# Patient Record
Sex: FEMALE | Race: WHITE | NOT HISPANIC OR LATINO | URBAN - METROPOLITAN AREA
[De-identification: names, ages, dates, MRNs, and addresses within clinical notes are randomized per-mention and may not be internally consistent; named-entity substitution may affect disease eponyms.]

---

## 2017-07-20 ENCOUNTER — OFFICE (OUTPATIENT)
Dept: URBAN - METROPOLITAN AREA OTHER 6 | Facility: OTHER | Age: 62
End: 2017-07-20

## 2017-07-20 VITALS
WEIGHT: 124 LBS | DIASTOLIC BLOOD PRESSURE: 68 MMHG | HEIGHT: 64 IN | SYSTOLIC BLOOD PRESSURE: 120 MMHG | HEART RATE: 74 BPM

## 2017-07-20 DIAGNOSIS — Z86.010 PERSONAL HISTORY OF COLONIC POLYPS: ICD-10-CM

## 2017-07-20 DIAGNOSIS — K59.00 CONSTIPATION, UNSPECIFIED: ICD-10-CM

## 2017-07-20 PROCEDURE — 99213 OFFICE O/P EST LOW 20 MIN: CPT

## 2017-07-20 RX ORDER — SODIUM PHOSPHATE, MONOBASIC, MONOHYDRATE, SODIUM PHOSPHATE, DIBASIC ANHYDROUS 1.102; .398 G/1; G/1
TABLET ORAL
Qty: 28 | Refills: 0 | Status: ACTIVE
Start: 2017-07-20

## 2017-07-20 RX ORDER — POLYETHYLENE GLYCOL 3350 17 G/17G
17 POWDER, FOR SOLUTION ORAL
Qty: 1 | Refills: 11 | Status: ACTIVE
Start: 2017-07-20

## 2017-07-31 ENCOUNTER — OFFICE VISIT (OUTPATIENT)
Dept: ORTHOPEDIC SURGERY | Facility: CLINIC | Age: 62
End: 2017-07-31

## 2017-07-31 VITALS
DIASTOLIC BLOOD PRESSURE: 80 MMHG | HEART RATE: 74 BPM | BODY MASS INDEX: 20.83 KG/M2 | SYSTOLIC BLOOD PRESSURE: 115 MMHG | HEIGHT: 65 IN | WEIGHT: 125 LBS

## 2017-07-31 DIAGNOSIS — M17.12 PATELLOFEMORAL ARTHRITIS OF LEFT KNEE: ICD-10-CM

## 2017-07-31 DIAGNOSIS — S80.01XA CONTUSION OF RIGHT KNEE, INITIAL ENCOUNTER: ICD-10-CM

## 2017-07-31 DIAGNOSIS — S80.02XA CONTUSION OF LEFT KNEE, INITIAL ENCOUNTER: ICD-10-CM

## 2017-07-31 DIAGNOSIS — R52 PAIN: Primary | ICD-10-CM

## 2017-07-31 DIAGNOSIS — M17.11 PATELLOFEMORAL ARTHRITIS OF RIGHT KNEE: ICD-10-CM

## 2017-07-31 PROCEDURE — 73562 X-RAY EXAM OF KNEE 3: CPT | Performed by: ORTHOPAEDIC SURGERY

## 2017-07-31 PROCEDURE — 99214 OFFICE O/P EST MOD 30 MIN: CPT | Performed by: ORTHOPAEDIC SURGERY

## 2017-07-31 RX ORDER — ALENDRONATE SODIUM 70 MG/1
TABLET ORAL
Refills: 11 | COMMUNITY
Start: 2017-05-18 | End: 2020-05-28

## 2017-07-31 RX ORDER — MELOXICAM 7.5 MG/1
7.5 TABLET ORAL DAILY
Qty: 30 TABLET | Refills: 5 | Status: SHIPPED | OUTPATIENT
Start: 2017-07-31 | End: 2018-11-28

## 2017-07-31 NOTE — PROGRESS NOTES
Subjective: Bilateral knee pain right worse than left     Patient ID: Ani Chandler is a 61 y.o. female.    Chief Complaint:    History of Present Illness 61-year-old female presents evaluation of both knees which he injured at work on June 20 when she fell.  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  She had arthroscopic surgery in the right knee by me in October 2015 a states is doing well to this episode.  No other associated injuries with this incident on June 20.  States she developed ecchymosis to both knees and had moderate pain in that knee since that time.  Did not seek attention until today.  Although somewhat better still having pain particularly in the right knee posteriorly.  She is not taking any anti-inflammatory medication.       Social History     Occupational History   • Not on file.     Social History Main Topics   • Smoking status: Never Smoker   • Smokeless tobacco: Not on file   • Alcohol use No   • Drug use: No   • Sexual activity: Not on file      Review of Systems   Constitutional: Negative for chills, diaphoresis, fever and unexpected weight change.   HENT: Positive for hearing loss and tinnitus. Negative for nosebleeds and sore throat.    Eyes: Negative for pain and visual disturbance.   Respiratory: Negative for cough, shortness of breath and wheezing.    Cardiovascular: Negative for chest pain and palpitations.   Gastrointestinal: Negative for abdominal pain, diarrhea, nausea and vomiting.   Endocrine: Positive for cold intolerance. Negative for heat intolerance and polydipsia.   Genitourinary: Negative for difficulty urinating, dysuria and hematuria.   Musculoskeletal: Negative for arthralgias, joint swelling and myalgias.   Skin: Negative for rash and wound.   Allergic/Immunologic: Positive for environmental allergies.   Neurological: Negative for dizziness, syncope and numbness.   Hematological: Does not bruise/bleed easily.    Psychiatric/Behavioral: Negative for dysphoric mood and sleep disturbance. The patient is nervous/anxious.    All other systems reviewed and are negative.        Past Medical History:   Diagnosis Date   • Cancer     Breast   • Depression     with anxiety   • GERD (gastroesophageal reflux disease)    • Hypothyroidism    • Menopausal symptoms    • Osteopenia    • Pancreatitis     2002   • Polyp of sigmoid colon    • Thyroid disease      Past Surgical History:   Procedure Laterality Date   • BREAST SURGERY     • CATARACT EXTRACTION     • HYSTERECTOMY      In 1997 for a cyst     Family History   Problem Relation Age of Onset   • Hypertension Mother    • Diabetes Father    • Hypertension Father    • Heart disease Other    • Stroke Other          Objective:  Vitals:    07/31/17 0920   BP: 115/80   Pulse: 74     Last 3 weights    07/31/17 0920   Weight: 125 lb (56.7 kg)     Body mass index is 20.8 kg/(m^2).       Ortho Exam  AP lateral sunrise view of both knees done to evaluate her chief complain shows patellofemoral arthritis but no other acute or chronic changes noted.  She is alert and oriented ×3.  Head is eyes nose and throat are normocephalic and pupils equal round reactive to light sclerae clear.  Both knee show no ecchymosis bruising or swelling or effusion.  The skin is cool to touch.  Her calf is nontender negative Homans.  She has 0-125° of motion of both knees with moderate crepitus particularly the right knee throughout the arc of motion.  His no instability at 0 90°.  There is no tenderness posteriorly although that is what she complains of pain in the right knee.  Quad function is 5 over 5 on both knees.  She has taken anti-inflammatories in the past without any GI side effects but she is currently not taking any medication.  Although better she still having some pain in the right knee particularly when she attempts to kneel.    Assessment:       1. Pain    2. Contusion of right knee, initial encounter     3. Contusion of left knee, initial encounter    4. Patellofemoral arthritis of left knee    5. Patellofemoral arthritis of right knee          Plan:        I reviewed the x-ray findings and physical findings with the patient.  I believe she has contusions to both knees aggravating a pre-existing patellofemoral arthritis.  I will start a nonsteroidal anti-inflammatory in the form of Mobic 7.5 daily.  Return in a month if still symptomatic we'll inject.  Discussed history implant the patient and she is in agreement.    Work Status:    TANK query complete.    Orders:  Orders Placed This Encounter   Procedures   • XR Knee 3 View Bilateral       Medications:  New Medications Ordered This Visit   Medications   • alendronate (FOSAMAX) 70 MG tablet     Sig: TAKE ONE TABLET BY MOUTH WEEKLY     Refill:  11   • meloxicam (MOBIC) 7.5 MG tablet     Sig: Take 1 tablet by mouth Daily.     Dispense:  30 tablet     Refill:  5       Followup:  Return in about 4 weeks (around 8/28/2017).          Dragon transcription disclaimer     Much of this encounter note is an electronic transcription/translation of spoken language to printed text. The electronic translation of spoken language may permit erroneous, or at times, nonsensical words or phrases to be inadvertently transcribed. Although I have reviewed the note for such errors, some may still exist.

## 2017-08-07 ENCOUNTER — APPOINTMENT (OUTPATIENT)
Dept: ULTRASOUND IMAGING | Facility: HOSPITAL | Age: 62
End: 2017-08-07

## 2017-08-07 ENCOUNTER — HOSPITAL ENCOUNTER (EMERGENCY)
Facility: HOSPITAL | Age: 62
Discharge: HOME | End: 2017-08-07
Admitting: PHYSICIAN ASSISTANT

## 2017-08-07 ENCOUNTER — TELEPHONE (OUTPATIENT)
Dept: ORTHOPEDIC SURGERY | Facility: CLINIC | Age: 62
End: 2017-08-07

## 2017-08-07 VITALS
BODY MASS INDEX: 20.83 KG/M2 | RESPIRATION RATE: 16 BRPM | HEIGHT: 65 IN | DIASTOLIC BLOOD PRESSURE: 66 MMHG | HEART RATE: 71 BPM | SYSTOLIC BLOOD PRESSURE: 111 MMHG | WEIGHT: 125 LBS | TEMPERATURE: 98.5 F | OXYGEN SATURATION: 96 %

## 2017-08-07 DIAGNOSIS — M79.604 PAIN OF RIGHT LOWER EXTREMITY: Primary | ICD-10-CM

## 2017-08-07 LAB
ALBUMIN SERPL-MCNC: 3.9 G/DL (ref 3.5–5.2)
ALBUMIN/GLOB SERPL: 1.6 G/DL
ALP SERPL-CCNC: 81 U/L (ref 40–129)
ALT SERPL W P-5'-P-CCNC: 30 U/L (ref 5–33)
ANION GAP SERPL CALCULATED.3IONS-SCNC: 11.7 MMOL/L
APTT PPP: 31.9 SECONDS (ref 24.3–38.1)
AST SERPL-CCNC: 21 U/L (ref 5–32)
BASOPHILS # BLD AUTO: 0.04 10*3/MM3 (ref 0–0.2)
BASOPHILS NFR BLD AUTO: 0.8 % (ref 0–2)
BILIRUB SERPL-MCNC: 0.2 MG/DL (ref 0.2–1.2)
BILIRUB UR QL STRIP: NEGATIVE
BUN BLD-MCNC: 16 MG/DL (ref 8–23)
BUN/CREAT SERPL: 26.2 (ref 7–25)
CALCIUM SPEC-SCNC: 9.2 MG/DL (ref 8.8–10.5)
CHLORIDE SERPL-SCNC: 103 MMOL/L (ref 98–107)
CK SERPL-CCNC: 94 U/L (ref 26–142)
CLARITY UR: ABNORMAL
CO2 SERPL-SCNC: 25.3 MMOL/L (ref 22–29)
COLOR UR: YELLOW
CREAT BLD-MCNC: 0.61 MG/DL (ref 0.57–1)
DEPRECATED RDW RBC AUTO: 40.9 FL (ref 37–54)
EOSINOPHIL # BLD AUTO: 0.05 10*3/MM3 (ref 0.1–0.3)
EOSINOPHIL NFR BLD AUTO: 0.9 % (ref 0–4)
ERYTHROCYTE [DISTWIDTH] IN BLOOD BY AUTOMATED COUNT: 12.1 % (ref 11.5–14.5)
GFR SERPL CREATININE-BSD FRML MDRD: 100 ML/MIN/1.73
GLOBULIN UR ELPH-MCNC: 2.5 GM/DL
GLUCOSE BLD-MCNC: 127 MG/DL (ref 65–99)
GLUCOSE UR STRIP-MCNC: NEGATIVE MG/DL
HCT VFR BLD AUTO: 40.3 % (ref 37–47)
HGB BLD-MCNC: 13.7 G/DL (ref 12–16)
HGB UR QL STRIP.AUTO: NEGATIVE
IMM GRANULOCYTES # BLD: 0.01 10*3/MM3 (ref 0–0.03)
IMM GRANULOCYTES NFR BLD: 0.2 % (ref 0–0.5)
INR PPP: 0.92 (ref 0.9–1.1)
KETONES UR QL STRIP: NEGATIVE
LEUKOCYTE ESTERASE UR QL STRIP.AUTO: NEGATIVE
LYMPHOCYTES # BLD AUTO: 1.2 10*3/MM3 (ref 0.6–4.8)
LYMPHOCYTES NFR BLD AUTO: 22.6 % (ref 20–45)
MCH RBC QN AUTO: 31.2 PG (ref 27–31)
MCHC RBC AUTO-ENTMCNC: 34 G/DL (ref 31–37)
MCV RBC AUTO: 91.8 FL (ref 81–99)
MONOCYTES # BLD AUTO: 0.32 10*3/MM3 (ref 0–1)
MONOCYTES NFR BLD AUTO: 6 % (ref 3–8)
NEUTROPHILS # BLD AUTO: 3.69 10*3/MM3 (ref 1.5–8.3)
NEUTROPHILS NFR BLD AUTO: 69.5 % (ref 45–70)
NITRITE UR QL STRIP: NEGATIVE
NRBC BLD MANUAL-RTO: 0 /100 WBC (ref 0–0)
PH UR STRIP.AUTO: 8 [PH] (ref 4.5–8)
PLATELET # BLD AUTO: 290 10*3/MM3 (ref 140–500)
PMV BLD AUTO: 9.7 FL (ref 7.4–10.4)
POTASSIUM BLD-SCNC: 3.9 MMOL/L (ref 3.5–5.2)
PROT SERPL-MCNC: 6.4 G/DL (ref 6–8.5)
PROT UR QL STRIP: NEGATIVE
PROTHROMBIN TIME: 12.3 SECONDS (ref 12.1–15)
RBC # BLD AUTO: 4.39 10*6/MM3 (ref 4.2–5.4)
SODIUM BLD-SCNC: 140 MMOL/L (ref 136–145)
SP GR UR STRIP: 1.01 (ref 1–1.03)
UROBILINOGEN UR QL STRIP: ABNORMAL
WBC NRBC COR # BLD: 5.31 10*3/MM3 (ref 4.8–10.8)

## 2017-08-07 PROCEDURE — 93971 EXTREMITY STUDY: CPT

## 2017-08-07 PROCEDURE — 99284 EMERGENCY DEPT VISIT MOD MDM: CPT | Performed by: PHYSICIAN ASSISTANT

## 2017-08-07 PROCEDURE — 82550 ASSAY OF CK (CPK): CPT | Performed by: PHYSICIAN ASSISTANT

## 2017-08-07 PROCEDURE — 80053 COMPREHEN METABOLIC PANEL: CPT | Performed by: PHYSICIAN ASSISTANT

## 2017-08-07 PROCEDURE — 85610 PROTHROMBIN TIME: CPT | Performed by: PHYSICIAN ASSISTANT

## 2017-08-07 PROCEDURE — 99283 EMERGENCY DEPT VISIT LOW MDM: CPT

## 2017-08-07 PROCEDURE — 81003 URINALYSIS AUTO W/O SCOPE: CPT | Performed by: PHYSICIAN ASSISTANT

## 2017-08-07 PROCEDURE — 85730 THROMBOPLASTIN TIME PARTIAL: CPT | Performed by: PHYSICIAN ASSISTANT

## 2017-08-07 PROCEDURE — 85025 COMPLETE CBC W/AUTO DIFF WBC: CPT | Performed by: PHYSICIAN ASSISTANT

## 2017-08-07 RX ORDER — METHOCARBAMOL 750 MG/1
750 TABLET, FILM COATED ORAL 3 TIMES DAILY PRN
Qty: 20 TABLET | Refills: 0 | Status: SHIPPED | OUTPATIENT
Start: 2017-08-07 | End: 2018-11-28

## 2017-08-07 RX ORDER — HYDROCODONE BITARTRATE AND ACETAMINOPHEN 5; 325 MG/1; MG/1
1 TABLET ORAL ONCE
Status: COMPLETED | OUTPATIENT
Start: 2017-08-07 | End: 2017-08-07

## 2017-08-07 RX ORDER — POLYETHYLENE GLYCOL 3350 17 G/17G
17 POWDER, FOR SOLUTION ORAL DAILY
COMMUNITY
End: 2018-11-28

## 2017-08-07 RX ADMIN — HYDROCODONE BITARTRATE AND ACETAMINOPHEN 1 TABLET: 5; 325 TABLET ORAL at 14:56

## 2017-08-07 NOTE — ED PROVIDER NOTES
Subjective   History of Present Illness  History of Present Illness    Chief complaint: Leg pain    Location: Right calf    Quality/Severity:  Aching    Timing/Duration: A few days    Modifying Factors: Nothing specific makes worse or better.    Associated Symptoms: Denies chest pain or shortness of breath.  Denies fevers or chills.  Denies any trauma.      Narrative: 61-year-old female presents with right calf pain.  She initially had an injury in June where she fell on both of her knees.  She continued to have knee pain.  She followed up with Dr. Gomez last week.  At that time she was not having any calf pain or swelling.  Since then she has developed calf tenderness that she describes as an ache and also feeling tight.  She states that the leg swells and then goes down.     Review of Systems  General: Denies fevers or chills.  Denies any weakness or fatigue.  Denies any weight loss or weight gain.  SKIN: Denies any rashes lesions or ulcers.  Denies color change.  ENT: Denies sore throat or rhinorrhea.  Denies ear pain.    EYES: Denies any blurred vision.  Denies any change in vision.  Denies any photophobia.  Denies any vision loss.  LUNGS: Denies any shortness of breath or wheezing.  Denies any cough.  Denies any hemoptysis.  CARDIAC: Denies any chest pain.  Denies palpitations.  Denies syncope.  Denies any edema  ABD: Denies any abdominal pain.  Denies any nausea or vomiting or diarrhea.  Denies any rectal bleeding.  Denies constipation  : Denies any dysuria, urgency, frequency or hematuria.  Denies discharge.  Denies flank pain.  NEURO: Denies any focal weakness.  Denies headache.  Denies seizures.  Denies changes in speech or difficulty walking.  ENDOCRINE: Denies polydipsia and polyuria  M/S: Arthralgias and myalgias as above.  Denies , back pain, or neck pain  HEME/LYMPH: Negative for adenopathy. Does not bruise/bleed easily.   PSYCH: Negative for suicidal ideas. Denies anxiety or depression  A 10 system  review was performed in addition to those in the above all other reviews are negative.      Past Medical History:   Diagnosis Date   • Cancer     Breast   • Depression     with anxiety   • GERD (gastroesophageal reflux disease)    • Hypothyroidism    • Menopausal symptoms    • Osteopenia    • Pancreatitis     2002   • Polyp of sigmoid colon    • Thyroid disease        No Known Allergies    Past Surgical History:   Procedure Laterality Date   • BREAST SURGERY     • CATARACT EXTRACTION     • HYSTERECTOMY      In 1997 for a cyst       Family History   Problem Relation Age of Onset   • Hypertension Mother    • Diabetes Father    • Hypertension Father    • Heart disease Other    • Stroke Other        Social History     Social History   • Marital status:      Spouse name: N/A   • Number of children: N/A   • Years of education: N/A     Social History Main Topics   • Smoking status: Never Smoker   • Smokeless tobacco: None   • Alcohol use No   • Drug use: No   • Sexual activity: Not Asked     Other Topics Concern   • None     Social History Narrative     No current facility-administered medications on file prior to encounter.      Current Outpatient Prescriptions on File Prior to Encounter   Medication Sig Dispense Refill   • alendronate (FOSAMAX) 70 MG tablet TAKE ONE TABLET BY MOUTH WEEKLY  11   • aspirin 81 MG tablet Take 81 mg by mouth daily.     • Bioflavonoid Products (MARCO-C) tablet Take 2,000 mg by mouth daily.     • Cholecalciferol (VITAMIN D3) 2000 UNITS capsule Take  by mouth daily. liquid     • levothyroxine (SYNTHROID, LEVOTHROID) 88 MCG tablet TAKE ONE TABLET BY MOUTH DAILY 90 tablet 0   • meloxicam (MOBIC) 7.5 MG tablet Take 1 tablet by mouth Daily. 30 tablet 5   • Multiple Vitamin (MULTIVITAMINS PO) Take  by mouth daily.     • Omega-3 Fatty Acids (OMEGA 3 PO) Take 2,000 mg by mouth daily.     • Probiotic capsule Take  by mouth daily.     • Vitamin E 400 UNITS tablet Take 400 tablets by mouth daily.                    Objective   Physical Exam  Vitals:    08/07/17 1412   BP: 131/80   Pulse: 88   Resp: 18   Temp: 98.5 °F (36.9 °C)   SpO2: 98%     GENERAL: a/o x 4, NAD  SKIN: Warm pink and dry   HEENT:  PERRLA, EOM intact, conjunctiva normal, sclera clear  NECK: supple, no JVD  LUNGS: Clear to auscultation bilaterally without wheezes, rales or rhonchi.  No accessory muscle use and no nasal flaring.  CARDIAC:  Regular rate and rhythm, S1-S2.  2/6 systolic murmurs at RSB, no rubs or gallops.  No peripheral edema.  Equal pulses bilaterally.  ABDOMEN: Soft, nontender, nondistended.  No guarding or rebound tenderness.  Normal bowel sounds.  MUSCULOSKELETAL: Moves all extremities well.  No deformity.  No swelling.  Calf is tender to palpation.  Achilles is intact.  NEURO: Cranial nerves II through XII grossly intact.  No gross focal deficits.  Alert.  Normal speech and motor.  PSYCH: Normal mood and affect      Procedures         ED Course  ED Course      [place] x 1.     Results for orders placed or performed during the hospital encounter of 08/07/17   Comprehensive Metabolic Panel   Result Value Ref Range    Glucose 127 (H) 65 - 99 mg/dL    BUN 16 8 - 23 mg/dL    Creatinine 0.61 0.57 - 1.00 mg/dL    Sodium 140 136 - 145 mmol/L    Potassium 3.9 3.5 - 5.2 mmol/L    Chloride 103 98 - 107 mmol/L    CO2 25.3 22.0 - 29.0 mmol/L    Calcium 9.2 8.8 - 10.5 mg/dL    Total Protein 6.4 6.0 - 8.5 g/dL    Albumin 3.90 3.50 - 5.20 g/dL    ALT (SGPT) 30 5 - 33 U/L    AST (SGOT) 21 5 - 32 U/L    Alkaline Phosphatase 81 40 - 129 U/L    Total Bilirubin 0.2 0.2 - 1.2 mg/dL    eGFR Non African Amer 100 >60 mL/min/1.73    Globulin 2.5 gm/dL    A/G Ratio 1.6 g/dL    BUN/Creatinine Ratio 26.2 (H) 7.0 - 25.0    Anion Gap 11.7 mmol/L   Urinalysis With / Culture If Indicated   Result Value Ref Range    Color, UA Yellow Yellow, Straw    Appearance, UA Cloudy (A) Clear    pH, UA 8.0 4.5 - 8.0    Specific Gravity, UA 1.015 1.003 - 1.030    Glucose, UA  "Negative Negative    Ketones, UA Negative Negative, 80 mg/dL (3+), >=160 mg/dL (4+)    Bilirubin, UA Negative Negative    Blood, UA Negative Negative    Protein, UA Negative Negative    Leuk Esterase, UA Negative Negative    Nitrite, UA Negative Negative    Urobilinogen, UA 0.2 E.U./dL 0.2 - 1.0 E.U./dL   CK   Result Value Ref Range    Creatine Kinase 94 26 - 142 U/L   Protime-INR   Result Value Ref Range    Protime 12.3 12.1 - 15.0 Seconds    INR 0.92 0.90 - 1.10   aPTT   Result Value Ref Range    PTT 31.9 24.3 - 38.1 seconds   CBC Auto Differential   Result Value Ref Range    WBC 5.31 4.80 - 10.80 10*3/mm3    RBC 4.39 4.20 - 5.40 10*6/mm3    Hemoglobin 13.7 12.0 - 16.0 g/dL    Hematocrit 40.3 37.0 - 47.0 %    MCV 91.8 81.0 - 99.0 fL    MCH 31.2 (H) 27.0 - 31.0 pg    MCHC 34.0 31.0 - 37.0 g/dL    RDW 12.1 11.5 - 14.5 %    RDW-SD 40.9 37.0 - 54.0 fl    MPV 9.7 7.4 - 10.4 fL    Platelets 290 140 - 500 10*3/mm3    Neutrophil % 69.5 45.0 - 70.0 %    Lymphocyte % 22.6 20.0 - 45.0 %    Monocyte % 6.0 3.0 - 8.0 %    Eosinophil % 0.9 0.0 - 4.0 %    Basophil % 0.8 0.0 - 2.0 %    Immature Grans % 0.2 0.0 - 0.5 %    Neutrophils, Absolute 3.69 1.50 - 8.30 10*3/mm3    Lymphocytes, Absolute 1.20 0.60 - 4.80 10*3/mm3    Monocytes, Absolute 0.32 0.00 - 1.00 10*3/mm3    Eosinophils, Absolute 0.05 (L) 0.10 - 0.30 10*3/mm3    Basophils, Absolute 0.04 0.00 - 0.20 10*3/mm3    Immature Grans, Absolute 0.01 0.00 - 0.03 10*3/mm3    nRBC 0.0 0.0 - 0.0 /100 WBC     Labs unremarkable.   1755- pain improved.  Still feels a little \"tight\".  Discussed labs.  Still awaiting ultrasound results.    Reviewed venous doppler. Independently viewed by me. Interpreted by radiologist. Discussed with pt.  NO DVT .               MDM  Number of Diagnoses or Management Options  Pain of right lower extremity: new and requires workup     Amount and/or Complexity of Data Reviewed  Clinical lab tests: reviewed and ordered  Tests in the radiology section of CPT®: " ordered and reviewed  Tests in the medicine section of CPT®: reviewed and ordered  Independent visualization of images, tracings, or specimens: yes    Risk of Complications, Morbidity, and/or Mortality  Presenting problems: moderate  Diagnostic procedures: moderate  Management options: low    Patient Progress  Patient progress: improved      Final diagnoses:   Pain of right lower extremity     EMR Dragon/Transcription disclaimer:      Much of this encounter note is an electronic transcription/translation of spoken language to printed text. The electronic translation of spoken language may permit erroneous, or at times, nonsensical words or phrases to be inadvertently transcribed; Although I have reviewed the note for such errors, some may still exist.            Marisabel Ken PA-C  08/07/17 1816

## 2017-08-10 ENCOUNTER — ANESTHESIA EVENT (OUTPATIENT)
Dept: PERIOP | Facility: HOSPITAL | Age: 62
End: 2017-08-10

## 2017-08-11 ENCOUNTER — ANESTHESIA (OUTPATIENT)
Dept: PERIOP | Facility: HOSPITAL | Age: 62
End: 2017-08-11

## 2017-08-11 ENCOUNTER — PREP FOR SURGERY (OUTPATIENT)
Dept: OTHER | Facility: HOSPITAL | Age: 62
End: 2017-08-11

## 2017-08-11 ENCOUNTER — ON CAMPUS - OUTPATIENT (OUTPATIENT)
Dept: URBAN - METROPOLITAN AREA HOSPITAL 28 | Facility: HOSPITAL | Age: 62
End: 2017-08-11

## 2017-08-11 ENCOUNTER — HOSPITAL ENCOUNTER (OUTPATIENT)
Facility: HOSPITAL | Age: 62
Setting detail: HOSPITAL OUTPATIENT SURGERY
Discharge: HOME | End: 2017-08-11
Attending: INTERNAL MEDICINE | Admitting: INTERNAL MEDICINE

## 2017-08-11 VITALS
OXYGEN SATURATION: 97 % | RESPIRATION RATE: 16 BRPM | BODY MASS INDEX: 20.24 KG/M2 | TEMPERATURE: 97.1 F | HEIGHT: 65 IN | SYSTOLIC BLOOD PRESSURE: 103 MMHG | HEART RATE: 73 BPM | WEIGHT: 121.5 LBS | DIASTOLIC BLOOD PRESSURE: 68 MMHG

## 2017-08-11 DIAGNOSIS — Z86.010 PERSONAL HISTORY OF COLONIC POLYPS: Primary | ICD-10-CM

## 2017-08-11 DIAGNOSIS — K59.00 CONSTIPATION, UNSPECIFIED: ICD-10-CM

## 2017-08-11 DIAGNOSIS — K59.00 ACUTE CONSTIPATION: ICD-10-CM

## 2017-08-11 DIAGNOSIS — Z86.010 HISTORY OF COLON POLYPS: ICD-10-CM

## 2017-08-11 DIAGNOSIS — Z86.010 PERSONAL HISTORY OF COLONIC POLYPS: ICD-10-CM

## 2017-08-11 DIAGNOSIS — K63.5 POLYP OF COLON: ICD-10-CM

## 2017-08-11 PROCEDURE — 45380 COLONOSCOPY AND BIOPSY: CPT

## 2017-08-11 PROCEDURE — 25010000002 PROPOFOL 10 MG/ML EMULSION: Performed by: NURSE ANESTHETIST, CERTIFIED REGISTERED

## 2017-08-11 RX ORDER — LIDOCAINE HYDROCHLORIDE 10 MG/ML
0.5 INJECTION, SOLUTION EPIDURAL; INFILTRATION; INTRACAUDAL; PERINEURAL ONCE AS NEEDED
Status: COMPLETED | OUTPATIENT
Start: 2017-08-11 | End: 2017-08-11

## 2017-08-11 RX ORDER — GLYCOPYRROLATE 0.2 MG/ML
INJECTION INTRAMUSCULAR; INTRAVENOUS AS NEEDED
Status: DISCONTINUED | OUTPATIENT
Start: 2017-08-11 | End: 2017-08-11 | Stop reason: SURG

## 2017-08-11 RX ORDER — SODIUM CHLORIDE, SODIUM LACTATE, POTASSIUM CHLORIDE, CALCIUM CHLORIDE 600; 310; 30; 20 MG/100ML; MG/100ML; MG/100ML; MG/100ML
9 INJECTION, SOLUTION INTRAVENOUS CONTINUOUS
Status: DISCONTINUED | OUTPATIENT
Start: 2017-08-11 | End: 2017-08-11 | Stop reason: HOSPADM

## 2017-08-11 RX ORDER — PROPOFOL 10 MG/ML
VIAL (ML) INTRAVENOUS AS NEEDED
Status: DISCONTINUED | OUTPATIENT
Start: 2017-08-11 | End: 2017-08-11 | Stop reason: SURG

## 2017-08-11 RX ORDER — PROPOFOL 10 MG/ML
VIAL (ML) INTRAVENOUS CONTINUOUS PRN
Status: DISCONTINUED | OUTPATIENT
Start: 2017-08-11 | End: 2017-08-11 | Stop reason: SURG

## 2017-08-11 RX ORDER — SODIUM CHLORIDE 0.9 % (FLUSH) 0.9 %
1-10 SYRINGE (ML) INJECTION AS NEEDED
Status: DISCONTINUED | OUTPATIENT
Start: 2017-08-11 | End: 2017-08-11 | Stop reason: HOSPADM

## 2017-08-11 RX ORDER — LIDOCAINE HYDROCHLORIDE 20 MG/ML
INJECTION, SOLUTION INFILTRATION; PERINEURAL AS NEEDED
Status: DISCONTINUED | OUTPATIENT
Start: 2017-08-11 | End: 2017-08-11 | Stop reason: SURG

## 2017-08-11 RX ORDER — SODIUM CHLORIDE 0.9 % (FLUSH) 0.9 %
1-10 SYRINGE (ML) INJECTION AS NEEDED
Status: CANCELLED | OUTPATIENT
Start: 2017-08-11

## 2017-08-11 RX ADMIN — PROPOFOL 100 MCG/KG/MIN: 10 INJECTION, EMULSION INTRAVENOUS at 08:27

## 2017-08-11 RX ADMIN — PROPOFOL 50 MG: 10 INJECTION, EMULSION INTRAVENOUS at 08:38

## 2017-08-11 RX ADMIN — SODIUM CHLORIDE, POTASSIUM CHLORIDE, SODIUM LACTATE AND CALCIUM CHLORIDE 9 ML/HR: 600; 310; 30; 20 INJECTION, SOLUTION INTRAVENOUS at 07:12

## 2017-08-11 RX ADMIN — PROPOFOL 50 MG: 10 INJECTION, EMULSION INTRAVENOUS at 08:27

## 2017-08-11 RX ADMIN — LIDOCAINE HYDROCHLORIDE 80 MG: 20 INJECTION, SOLUTION INFILTRATION; PERINEURAL at 08:27

## 2017-08-11 RX ADMIN — PROPOFOL 50 MG: 10 INJECTION, EMULSION INTRAVENOUS at 08:32

## 2017-08-11 RX ADMIN — PROPOFOL 50 MG: 10 INJECTION, EMULSION INTRAVENOUS at 08:36

## 2017-08-11 RX ADMIN — PROPOFOL 50 MG: 10 INJECTION, EMULSION INTRAVENOUS at 08:43

## 2017-08-11 RX ADMIN — GLYCOPYRROLATE 0.1 MG: 0.2 INJECTION INTRAMUSCULAR; INTRAVENOUS at 08:21

## 2017-08-11 RX ADMIN — LIDOCAINE HYDROCHLORIDE 0.5 ML: 10 INJECTION, SOLUTION EPIDURAL; INFILTRATION; INTRACAUDAL; PERINEURAL at 07:12

## 2017-08-11 NOTE — PLAN OF CARE
Problem: Patient Care Overview (Adult)  Goal: Plan of Care Review  Outcome: Ongoing (interventions implemented as appropriate)    08/11/17 0736   Coping/Psychosocial Response Interventions   Plan Of Care Reviewed With patient   Patient Care Overview   Progress improving   Outcome Evaluation   Outcome Summary/Follow up Plan vss, ready for or       Goal: Adult Individualization and Mutuality  Outcome: Ongoing (interventions implemented as appropriate)    Problem: GI Endoscopy (Adult)  Goal: Signs and Symptoms of Listed Potential Problems Will be Absent or Manageable (GI Endoscopy)  Outcome: Ongoing (interventions implemented as appropriate)

## 2017-08-11 NOTE — OP NOTE
Op Note  Date of Service: 8/11/2017 8:55 AM  Juan Jose Reid MD   Gastroenterology        COLONOSCOPY  Procedure Note     Ani Chandler  8/11/2017     Pre-op Diagnosis:   Acute constipation [K59.00]  History of colon polyps [Z86.010]     Post-op Diagnosis:     Post-Op Diagnosis Codes:     * Acute constipation [K59.00]     * History of colon polyps [Z86.010]     * Colon polyps [K63.5]     Procedure/CPT® Codes:        Procedure(s):  COLONOSCOPY with polypectomy     Surgeon(s):  Juan Jose Reid MD     Anesthesia: Monitor Anesthesia Care      Staff:   Circulator: Yareli Mascorro RN  Scrub Person: ERIKC PORTILLO     Estimated Blood Loss: None  Urine Voided: * No values recorded between 8/11/2017  8:18 AM and 8/11/2017  8:53 AM *     Specimens:                   ID Type Source Tests Collected by Time Destination   A : transverse colon polyp Polyp Large Intestine, Transverse Colon TISSUE EXAM Juan Jose Reid MD 8/11/2017 0847            Procedure:  After having signed informed consent, the patient was brought to the endoscopy suite and placed in the left lateral decubitus position and given her IV sedation. Rectal exam revealed no external lesions, normal anal tone, no rectal mass. The scope was introduced into the rectum advanced under direct visualization through a poorly prepped colon. The scope was advanced from the rectum to the sigmoid colon, around the particulate matter that could not be aspirated. The scope was advanced through the descending colon, to and around the splenic flexure, reduced and advanced to the transverse colon with some looping around the hepatic flexure. The scope was reduced into the ascending colon. The right side was not any better prepped and there was residual bowel contents that could not be flushed and aspirated. The scope was able to advanced into the cecum, identified by the appendiceal orifice and the ileocecal valve. The ileocecal  valve was briefly intubated. Terminal ileum was normal but there was residual bowel contents there as well. The scope was withdrawn back into the ascending colon, withdrawn slowly examining what could be visualized of the colonic mucosa in the circumferential fashion. Within the transverse colon was a single diminutive 3 mm polyp with mixed poor prep. This was biopsied and felt to be completely removed. There was excellent hemostasis. The scope was withdrawn through the remainder of the colon. No further mucosal lesions were noted. Within the rectum, it was not retroflexed due to poor prep. The scope was taken from the patient. She tolerated the procedure well.           Findings: Colon to TI POOR PREP  Polyp 3mm transverse colon cold biopsy     Complications: none     Recommendations:  Results to be called repeat in 3 years w 2 day prep        Juan Jose Reid MD      Date: 8/11/2017  Time: 8:55 AM

## 2017-08-11 NOTE — ANESTHESIA PREPROCEDURE EVALUATION
Anesthesia Evaluation     Patient summary reviewed and Nursing notes reviewed   NPO Solid Status: > 8 hours  NPO Liquid Status: > 8 hours     Airway   Mallampati: I  TM distance: >3 FB  Neck ROM: full  no difficulty expected  Dental - normal exam     Pulmonary - negative pulmonary ROS and normal exam    breath sounds clear to auscultation  Cardiovascular - negative cardio ROS and normal exam    Rhythm: regular  Rate: normal        Neuro/Psych  (-) psychiatric history  GI/Hepatic/Renal/Endo    (+)  hypothyroidism,   (-) GERD    Musculoskeletal     (+) back pain,       ROS comment: Recent fall @ work / pain rt knee  Abdominal    Substance History - negative use     OB/GYN          Other      history of cancer (breast / radiation) remission                                    Anesthesia Plan    ASA 2     MAC     intravenous induction   Anesthetic plan and risks discussed with patient.  Use of blood products discussed with patient  Consented to blood products.

## 2017-08-11 NOTE — ANESTHESIA POSTPROCEDURE EVALUATION
Patient: Ani Chandler    Procedure Summary     Date Anesthesia Start Anesthesia Stop Room / Location    08/11/17 0821 0855 [facility] /  [facility]       Procedure Diagnosis Surgeon Provider    COLONOSCOPY with polypectomy (N/A ) Acute constipation; History of colon polyps; Colon polyps  (Acute constipation [K59.00]; History of colon polyps [Z86.010]) MD Wendy Avila CRNA          Anesthesia Type: MAC  Last vitals  BP   103/68 (08/11/17 0920)    Temp   97.1 °F (36.2 °C) (08/11/17 0900)    Pulse   73 (08/11/17 0920)   Resp   16 (08/11/17 0920)    SpO2   97 % (08/11/17 0920)      Post Anesthesia Care and Evaluation    Patient location during evaluation: bedside  Patient participation: complete - patient participated  Level of consciousness: awake and alert  Pain score: 0  Pain management: adequate  Airway patency: patent  Anesthetic complications: No anesthetic complications  PONV Status: none  Cardiovascular status: acceptable  Respiratory status: acceptable  Hydration status: acceptable

## 2017-08-11 NOTE — H&P
Patient Care Team:  Juanito Moore MD as PCP - General (Family Medicine)    CHIEF COMPLAINT: Previous colon polyps    HISTORY OF PRESENT ILLNESS:    Last exam was 2013      Past Medical History:   Diagnosis Date   • Cancer     Breast   • Depression     with anxiety   • GERD (gastroesophageal reflux disease)    • Hypothyroidism    • Knee pain, acute    • Menopausal symptoms    • Osteopenia    • Osteoporosis    • Pancreatitis     2002   • Polyp of sigmoid colon    • Thyroid disease      Past Surgical History:   Procedure Laterality Date   • BREAST SURGERY     • CATARACT EXTRACTION     • COLONOSCOPY W/ POLYPECTOMY     • HYSTERECTOMY      In 1997 for a cyst   • KNEE ARTHROSCOPY MENISCUS TRANSPLANT Right      Family History   Problem Relation Age of Onset   • Hypertension Mother    • Diabetes Father    • Hypertension Father    • Heart disease Other    • Stroke Other      Social History   Substance Use Topics   • Smoking status: Never Smoker   • Smokeless tobacco: Former User   • Alcohol use No     Prescriptions Prior to Admission   Medication Sig Dispense Refill Last Dose   • levothyroxine (SYNTHROID, LEVOTHROID) 88 MCG tablet TAKE ONE TABLET BY MOUTH DAILY 90 tablet 0 8/11/2017 at 060   • polyethylene glycol (MIRALAX) packet Take 17 g by mouth Daily.   8/10/2017 at Unknown time   • alendronate (FOSAMAX) 70 MG tablet TAKE ONE TABLET BY MOUTH WEEKLY  11 8/7/2017   • aspirin 81 MG tablet Take 81 mg by mouth daily.   8/4/2017   • Bioflavonoid Products (MARCO-C) tablet Take 2,000 mg by mouth daily.   8/9/2017   • Cholecalciferol (VITAMIN D3) 2000 UNITS capsule Take  by mouth daily. liquid   8/9/2017   • meloxicam (MOBIC) 7.5 MG tablet Take 1 tablet by mouth Daily. 30 tablet 5 8/4/2017   • methocarbamol (ROBAXIN) 750 MG tablet Take 1 tablet by mouth 3 (Three) Times a Day As Needed for Muscle Spasms. 20 tablet 0 8/8/2017   • Multiple Vitamin (MULTIVITAMINS PO) Take  by mouth daily.   8/8/2017   • Omega-3 Fatty Acids  "(OMEGA 3 PO) Take 2,000 mg by mouth daily.   8/8/2017   • Probiotic capsule Take  by mouth daily.   8/8/2017     Allergies:  Review of patient's allergies indicates no known allergies.    REVIEW OF SYSTEMS:  Please see the above history of present illness for pertinent positives and negatives.  The remainder of the patient's systems have been reviewed and are negative.     Vital Signs  Temp:  [98 °F (36.7 °C)] 98 °F (36.7 °C)  Heart Rate:  [66] 66  Resp:  [16] 16  BP: (101)/(73) 101/73    Flowsheet Rows         First Filed Value    Admission Height  65\" (165.1 cm) Documented at 08/11/2017 0649    Admission Weight  121 lb 8 oz (55.1 kg) Documented at 08/11/2017 0649           Physical Exam:  Physical Exam   Constitutional: Patient appears well-developed and well-nourished and in no acute distress   HEENT:   Head: Normocephalic and atraumatic.   Eyes:  Pupils are equal, round, and reactive to light. EOM are intact. Sclera are anicteric and non-injected.  Mouth and Throat: Patient has moist mucous membranes. Oropharynx is clear of any erythema or exudate.     Neck: Neck supple. No JVD present. No thyromegaly present. No lymphadenopathy present.  Cardiovascular: Regular rate, regular rhythm, S1 normal and S2 normal.  Exam reveals no gallop and no friction rub.  No murmur heard.  Pulmonary/Chest: Lungs are clear to auscultation bilaterally. No respiratory distress. No wheezes. No rhonchi. No rales.   Abdominal: Soft. Bowel sounds are normal. No distension and no mass. There is no hepatosplenomegaly. There is no tenderness.   Musculoskeletal: Normal Muscle tone  Extremities: No edema. Pulses are palpable in all 4 extremities.  Neurological: Patient is alert and oriented to person, place, and time. Cranial nerves II-XII are grossly intact with no focal deficits.  Skin: Skin is warm. No rash noted. Nails show no clubbing.  No cyanosis or erythema.     Results Review:    I reviewed the patient's new clinical results.  Lab " Results (most recent)     None          Imaging Results (most recent)     None        reviewed    ECG/EMG Results (most recent)     None        reviewed    Assessment/Plan     Personal history pof colon polyps/ Colonoscopy    I discussed the patients findings and my recommendations with patient.     Juan Jose Reid MD  08/11/17  8:29 AM    Time: 10 min prior to procedure.

## 2017-08-11 NOTE — BRIEF OP NOTE
COLONOSCOPY  Procedure Note    Ani Chandler  8/11/2017    Pre-op Diagnosis:   Acute constipation [K59.00]  History of colon polyps [Z86.010]    Post-op Diagnosis:     Post-Op Diagnosis Codes:     * Acute constipation [K59.00]     * History of colon polyps [Z86.010]     * Colon polyps [K63.5]    Procedure/CPT® Codes:      Procedure(s):  COLONOSCOPY with polypectomy    Surgeon(s):  Juan Jose Reid MD    Anesthesia: [facility]     Staff:   Circulator: Yareli Mascorro RN  Scrub Person: ERICK PORTILLO    Estimated Blood Loss: *No blood loss documented*  Urine Voided: * No values recorded between 8/11/2017  8:18 AM and 8/11/2017  8:53 AM *    Specimens:                  ID Type Source Tests Collected by Time Destination   A : transverse colon polyp Polyp Large Intestine, Transverse Colon TISSUE EXAM Juan Jose Reid MD 8/11/2017 0847          Drains:           Findings: Colon to TI POOR PREP  Polyp 3mm transverse colon cold biopsy    Complications: none    Recommendations:  Results to be called repeat in 3 years w 2 day prep      Juan Jose Reid MD     Date: 8/11/2017  Time: 8:55 AM

## 2017-08-14 ENCOUNTER — OFFICE VISIT (OUTPATIENT)
Dept: ORTHOPEDIC SURGERY | Facility: CLINIC | Age: 62
End: 2017-08-14

## 2017-08-14 DIAGNOSIS — S80.01XA CONTUSION OF RIGHT KNEE, INITIAL ENCOUNTER: Primary | ICD-10-CM

## 2017-08-14 DIAGNOSIS — M17.11 PATELLOFEMORAL ARTHRITIS OF RIGHT KNEE: ICD-10-CM

## 2017-08-14 DIAGNOSIS — S80.02XA CONTUSION OF LEFT KNEE, INITIAL ENCOUNTER: ICD-10-CM

## 2017-08-14 DIAGNOSIS — M17.12 PATELLOFEMORAL ARTHRITIS OF LEFT KNEE: ICD-10-CM

## 2017-08-14 PROCEDURE — 99213 OFFICE O/P EST LOW 20 MIN: CPT | Performed by: ORTHOPAEDIC SURGERY

## 2017-08-14 PROCEDURE — 20610 DRAIN/INJ JOINT/BURSA W/O US: CPT | Performed by: ORTHOPAEDIC SURGERY

## 2017-08-14 RX ORDER — TRIAMCINOLONE ACETONIDE 40 MG/ML
40 INJECTION, SUSPENSION INTRA-ARTICULAR; INTRAMUSCULAR
Status: COMPLETED | OUTPATIENT
Start: 2017-08-14 | End: 2017-08-14

## 2017-08-14 RX ORDER — LIDOCAINE HYDROCHLORIDE 10 MG/ML
8 INJECTION, SOLUTION EPIDURAL; INFILTRATION; INTRACAUDAL; PERINEURAL
Status: COMPLETED | OUTPATIENT
Start: 2017-08-14 | End: 2017-08-14

## 2017-08-14 RX ADMIN — LIDOCAINE HYDROCHLORIDE 8 ML: 10 INJECTION, SOLUTION EPIDURAL; INFILTRATION; INTRACAUDAL; PERINEURAL at 09:42

## 2017-08-14 RX ADMIN — TRIAMCINOLONE ACETONIDE 40 MG: 40 INJECTION, SUSPENSION INTRA-ARTICULAR; INTRAMUSCULAR at 09:42

## 2017-08-14 NOTE — PROGRESS NOTES
Subjective: Bilateral knee pain     Patient ID: Ani Chandler is a 61 y.o. female.    Chief Complaint:    History of Present Illness 61-year-old female returns with continued pain and discomfort in both knees the right worse than the left.  She was seen in the emergency room on August 7 because of right calf pain and was concerned about a DVT but a venous Doppler proved negative.  She is slightly better but still has discomfort in both knees with single limit her activity.       Social History     Occupational History   • Not on file.     Social History Main Topics   • Smoking status: Never Smoker   • Smokeless tobacco: Former User   • Alcohol use No   • Drug use: No   • Sexual activity: Defer      Review of Systems   Constitutional: Negative for chills, diaphoresis, fever and unexpected weight change.   HENT: Negative for hearing loss, nosebleeds, sore throat and tinnitus.    Eyes: Negative for pain and visual disturbance.   Respiratory: Negative for cough, shortness of breath and wheezing.    Cardiovascular: Negative for chest pain and palpitations.   Gastrointestinal: Negative for abdominal pain, diarrhea, nausea and vomiting.   Endocrine: Negative for cold intolerance, heat intolerance and polydipsia.   Genitourinary: Negative for difficulty urinating, dysuria and hematuria.   Musculoskeletal: Positive for joint swelling and myalgias. Negative for arthralgias.   Skin: Negative for rash and wound.   Allergic/Immunologic: Negative for environmental allergies.   Neurological: Negative for dizziness, syncope and numbness.   Hematological: Does not bruise/bleed easily.   Psychiatric/Behavioral: Negative for dysphoric mood and sleep disturbance. The patient is not nervous/anxious.    All other systems reviewed and are negative.        Past Medical History:   Diagnosis Date   • Cancer     Breast   • Depression     with anxiety   • GERD (gastroesophageal reflux disease)    • Hypothyroidism    • Knee pain, acute    •  Menopausal symptoms    • Osteopenia    • Osteoporosis    • Pancreatitis     2002   • Polyp of sigmoid colon    • Thyroid disease      Past Surgical History:   Procedure Laterality Date   • BREAST SURGERY     • CATARACT EXTRACTION     • COLONOSCOPY W/ POLYPECTOMY     • HYSTERECTOMY      In 1997 for a cyst   • KNEE ARTHROSCOPY MENISCUS TRANSPLANT Right      Family History   Problem Relation Age of Onset   • Hypertension Mother    • Diabetes Father    • Hypertension Father    • Heart disease Other    • Stroke Other          Objective:  There were no vitals filed for this visit.  There were no vitals filed for this visit.  There is no height or weight on file to calculate BMI.       Ortho Exam  she is alert and oriented ×3.  The right leg shows no swelling effusion erythema.  Calf is nontender negative Homans.  Skin is cool to touch.  There is pain and crepitus with range of motion to the right knee but again is no effusion or swelling.  His no sensory loss.  Left knee is the same is no effusion swelling erythema.  There is no motor deficit good distal pulses and the skin is cool to touch negative Homans on the left knee also.  0-120° of motion with crepitus but no patella subluxation.    Assessment:       1. Contusion of right knee, initial encounter    2. Contusion of left knee, initial encounter    3. Patellofemoral arthritis of left knee    4. Patellofemoral arthritis of right knee          Plan:  I will inject both knees today and that was accomplished through the superolateral portal about sterile prepping with 8 cc lidocaine 1 cc Kenalog.  Postinjection instructions given to the patient.  She is to continue the nonsteroidal anti-inflammatory.  She inquired about getting MRIs been told her that if she doesn't respond to the cortisone injection but she comes back we'll then order an MRI of one of both knees.  No work until seen  Large Joint Arthrocentesis  Date/Time: 8/14/2017 9:42 AM  Consent given by:  patient  Site marked: site marked  Timeout: Immediately prior to procedure a time out was called to verify the correct patient, procedure, equipment, support staff and site/side marked as required   Supporting Documentation  Indications: pain   Procedure Details  Location: knee - Knee joint: BILATERAL.  Preparation: Patient was prepped and draped in the usual sterile fashion  Needle size: 22 G  Medications administered: 8 mL lidocaine PF 1% 1 %; 40 mg triamcinolone acetonide 40 MG/ML  Patient tolerance: patient tolerated the procedure well with no immediate complications        Work Status:    TANK query complete.    Orders:  Orders Placed This Encounter   Procedures   • Large Joint Arthrocentesis       Medications:  No orders of the defined types were placed in this encounter.      Followup:  Return in about 3 weeks (around 9/4/2017).          Dragon transcription disclaimer     Much of this encounter note is an electronic transcription/translation of spoken language to printed text. The electronic translation of spoken language may permit erroneous, or at times, nonsensical words or phrases to be inadvertently transcribed. Although I have reviewed the note for such errors, some may still exist.

## 2017-08-17 LAB
LAB AP CASE REPORT: NORMAL
Lab: NORMAL
PATH REPORT.FINAL DX SPEC: NORMAL

## 2017-09-11 ENCOUNTER — OFFICE VISIT (OUTPATIENT)
Dept: ORTHOPEDIC SURGERY | Facility: CLINIC | Age: 62
End: 2017-09-11

## 2017-09-11 VITALS — HEIGHT: 64 IN | BODY MASS INDEX: 21.34 KG/M2 | WEIGHT: 125 LBS

## 2017-09-11 DIAGNOSIS — M17.11 PATELLOFEMORAL ARTHRITIS OF RIGHT KNEE: ICD-10-CM

## 2017-09-11 DIAGNOSIS — S80.02XA CONTUSION OF LEFT KNEE, INITIAL ENCOUNTER: ICD-10-CM

## 2017-09-11 DIAGNOSIS — S80.01XA CONTUSION OF RIGHT KNEE, INITIAL ENCOUNTER: Primary | ICD-10-CM

## 2017-09-11 DIAGNOSIS — M17.12 PATELLOFEMORAL ARTHRITIS OF LEFT KNEE: ICD-10-CM

## 2017-09-11 PROCEDURE — 99213 OFFICE O/P EST LOW 20 MIN: CPT | Performed by: ORTHOPAEDIC SURGERY

## 2017-09-11 NOTE — PROGRESS NOTES
Subjective: Bilateral knee contusions     Patient ID: Ani Chandler is a 61 y.o. female.    Chief Complaint:    History of Present Illness 61-year-old female is seen in follow-up to the work-related injury to both knees.  Again she fell several weeks ago and is had persistent pain but responded to cortisone injection given on the last visit.  Just about all of her pain is completely resolved.  Again she had a pre-existing patellofemoral arthritis was asymptomatic until she fell however she is now asymptomatic once again following the cortisone injections     Social History     Occupational History   • Not on file.     Social History Main Topics   • Smoking status: Never Smoker   • Smokeless tobacco: Former User   • Alcohol use No   • Drug use: No   • Sexual activity: Defer      Review of Systems   Constitutional: Negative for chills, diaphoresis, fever and unexpected weight change.   HENT: Negative for hearing loss, nosebleeds, sore throat and tinnitus.    Eyes: Negative for pain and visual disturbance.   Respiratory: Negative for cough, shortness of breath and wheezing.    Cardiovascular: Negative for chest pain and palpitations.   Gastrointestinal: Negative for abdominal pain, diarrhea, nausea and vomiting.   Endocrine: Negative for cold intolerance, heat intolerance and polydipsia.   Genitourinary: Negative for difficulty urinating, dysuria and hematuria.   Musculoskeletal: Negative for arthralgias, joint swelling and myalgias.   Skin: Negative for rash and wound.   Allergic/Immunologic: Negative for environmental allergies.   Neurological: Negative for dizziness, syncope and numbness.   Hematological: Does not bruise/bleed easily.   Psychiatric/Behavioral: Negative for dysphoric mood and sleep disturbance. The patient is not nervous/anxious.          Past Medical History:   Diagnosis Date   • Cancer     Breast   • Depression     with anxiety   • GERD (gastroesophageal reflux disease)    • Hypothyroidism    •  Knee pain, acute    • Menopausal symptoms    • Osteopenia    • Osteoporosis    • Pancreatitis     2002   • Polyp of sigmoid colon    • Thyroid disease      Past Surgical History:   Procedure Laterality Date   • BREAST SURGERY     • CATARACT EXTRACTION     • COLONOSCOPY N/A 8/11/2017    Procedure: COLONOSCOPY with polypectomy;  Surgeon: Juan Jose Reid MD;  Location: [facility];  Service:    • COLONOSCOPY W/ POLYPECTOMY     • HYSTERECTOMY      In 1997 for a cyst   • KNEE ARTHROSCOPY MENISCUS TRANSPLANT Right      Family History   Problem Relation Age of Onset   • Hypertension Mother    • Diabetes Father    • Hypertension Father    • Heart disease Other    • Stroke Other          Objective:  There were no vitals filed for this visit.  Last 3 weights    09/11/17  0935   Weight: 125 lb (56.7 kg)     Body mass index is 21.46 kg/(m^2).       Ortho Exam  she is alert and oriented ×3.  Both knee show no swelling effusion erythema.  She has 0-125° of motion but there is moderate patellofemoral crepitus with range of motion.  There is no instability.  Calves are nontender negative Homans.  Quad function is 5 over 5 in the skin is cool to touch.  She is tolerating the meloxicam and no GI side effects.    Assessment:       1. Contusion of right knee, initial encounter    2. Contusion of left knee, initial encounter    3. Patellofemoral arthritis of right knee    4. Patellofemoral arthritis of left knee          Plan:      at this point the patient obtained using a meloxicam as needed.  She has complete resolution of her symptoms from the work-related injury.  Return to see me when necessary.      Work Status:    TANK query complete.    Orders:  No orders of the defined types were placed in this encounter.      Medications:  No orders of the defined types were placed in this encounter.      Followup:  Return if symptoms worsen or fail to improve.          Dragon transcription disclaimer     Much of this encounter  note is an electronic transcription/translation of spoken language to printed text. The electronic translation of spoken language may permit erroneous, or at times, nonsensical words or phrases to be inadvertently transcribed. Although I have reviewed the note for such errors, some may still exist.

## 2017-09-14 ENCOUNTER — TELEPHONE (OUTPATIENT)
Dept: ORTHOPEDIC SURGERY | Facility: CLINIC | Age: 62
End: 2017-09-14

## 2017-09-14 NOTE — TELEPHONE ENCOUNTER
She should continue the meloxicam on a daily basis and apply moist heat to the knee.  If she's not better the next 7-10 days neck follow-up appointment

## 2017-09-14 NOTE — TELEPHONE ENCOUNTER
Patient called, says that her right knee is tightening up and seems to be getting worse.  Do you have any other suggestions for her or does she need to be seen again?

## 2017-09-26 ENCOUNTER — APPOINTMENT (OUTPATIENT)
Dept: WOMENS IMAGING | Facility: HOSPITAL | Age: 62
End: 2017-09-26

## 2017-09-26 PROCEDURE — G0202 SCR MAMMO BI INCL CAD: HCPCS | Performed by: RADIOLOGY

## 2017-09-26 PROCEDURE — 77063 BREAST TOMOSYNTHESIS BI: CPT | Performed by: RADIOLOGY

## 2017-09-26 PROCEDURE — 77067 SCR MAMMO BI INCL CAD: CPT | Performed by: RADIOLOGY

## 2017-10-03 ENCOUNTER — TELEPHONE (OUTPATIENT)
Dept: ORTHOPEDIC SURGERY | Facility: CLINIC | Age: 62
End: 2017-10-03

## 2017-10-03 NOTE — TELEPHONE ENCOUNTER
I called to let Ani know that the letter she needed sent to [facility] for Workers Compensation was faxed to them today.  She did not answer so I left her a message.  I did let her know that Dr. Gomez did not actually write a letter, he said that what they needed was in his progress notes from her visit on September 11, 2017.  So I just faxed the the progress note to the Chiropractor @ [phone].

## 2017-10-31 ENCOUNTER — OFFICE VISIT (OUTPATIENT)
Dept: OBSTETRICS AND GYNECOLOGY | Facility: CLINIC | Age: 62
End: 2017-10-31

## 2017-10-31 VITALS
BODY MASS INDEX: 20.43 KG/M2 | DIASTOLIC BLOOD PRESSURE: 60 MMHG | WEIGHT: 119.7 LBS | HEIGHT: 64 IN | SYSTOLIC BLOOD PRESSURE: 94 MMHG

## 2017-10-31 DIAGNOSIS — Z11.51 SPECIAL SCREENING EXAMINATION FOR HUMAN PAPILLOMAVIRUS (HPV): ICD-10-CM

## 2017-10-31 DIAGNOSIS — D05.11 DUCTAL CARCINOMA IN SITU (DCIS) OF RIGHT BREAST: ICD-10-CM

## 2017-10-31 DIAGNOSIS — Z13.9 SCREENING FOR CONDITION: Primary | ICD-10-CM

## 2017-10-31 LAB
BILIRUB BLD-MCNC: NEGATIVE MG/DL
CLARITY, POC: CLEAR
COLOR UR: YELLOW
GLUCOSE UR STRIP-MCNC: NEGATIVE MG/DL
KETONES UR QL: NEGATIVE
LEUKOCYTE EST, POC: NEGATIVE
NITRITE UR-MCNC: NEGATIVE MG/ML
PH UR: 5 [PH] (ref 5–8)
PROT UR STRIP-MCNC: NEGATIVE MG/DL
RBC # UR STRIP: NEGATIVE /UL
SP GR UR: 1 (ref 1–1.03)
UROBILINOGEN UR QL: NORMAL

## 2017-10-31 PROCEDURE — 81002 URINALYSIS NONAUTO W/O SCOPE: CPT | Performed by: OBSTETRICS & GYNECOLOGY

## 2017-10-31 PROCEDURE — 99396 PREV VISIT EST AGE 40-64: CPT | Performed by: OBSTETRICS & GYNECOLOGY

## 2017-10-31 NOTE — PROGRESS NOTES
GYN Annual Exam     CC- Here for annual exam.     Pt new to practice? no  Pt new to me? no    HPI:   HPI    Ani Chandler is a 62 y.o. female who presents for annual well woman exam. Patient states first date of last normal period was: No LMP recorded. Patient has had a hysterectomy..       Problems:  Patient Active Problem List   Diagnosis   • Ductal carcinoma in situ (DCIS) of right breast   ; otherwise none    OB History     No data available            Past Medical History:   Diagnosis Date   • Cancer     Breast   • Depression     with anxiety   • GERD (gastroesophageal reflux disease)    • Hypothyroidism    • Knee pain, acute    • Menopausal symptoms    • Osteopenia    • Osteoporosis    • Pancreatitis     2002   • Polyp of sigmoid colon    • Thyroid disease        Past Surgical History:   Procedure Laterality Date   • BREAST SURGERY     • CATARACT EXTRACTION     • COLONOSCOPY N/A 8/11/2017    Procedure: COLONOSCOPY with polypectomy;  Surgeon: Juan Jose Reid MD;  Location: [facility];  Service:    • COLONOSCOPY W/ POLYPECTOMY     • HYSTERECTOMY      In 1997 for a cyst   • KNEE ARTHROSCOPY MENISCUS TRANSPLANT Right          Current Outpatient Prescriptions:   •  alendronate (FOSAMAX) 70 MG tablet, TAKE ONE TABLET BY MOUTH WEEKLY, Disp: , Rfl: 11  •  aspirin 81 MG tablet, Take 81 mg by mouth daily., Disp: , Rfl:   •  Bioflavonoid Products (MARCO-C) tablet, Take 2,000 mg by mouth daily., Disp: , Rfl:   •  Cholecalciferol (VITAMIN D3) 2000 UNITS capsule, Take  by mouth daily. liquid, Disp: , Rfl:   •  levothyroxine (SYNTHROID, LEVOTHROID) 88 MCG tablet, TAKE ONE TABLET BY MOUTH DAILY, Disp: 90 tablet, Rfl: 0  •  meloxicam (MOBIC) 7.5 MG tablet, Take 1 tablet by mouth Daily., Disp: 30 tablet, Rfl: 5  •  methocarbamol (ROBAXIN) 750 MG tablet, Take 1 tablet by mouth 3 (Three) Times a Day As Needed for Muscle Spasms., Disp: 20 tablet, Rfl: 0  •  Multiple Vitamin (MULTIVITAMINS PO), Take  by mouth daily.,  "Disp: , Rfl:   •  Omega-3 Fatty Acids (OMEGA 3 PO), Take 2,000 mg by mouth daily., Disp: , Rfl:   •  polyethylene glycol (MIRALAX) packet, Take 17 g by mouth Daily., Disp: , Rfl:   •  Probiotic capsule, Take  by mouth daily., Disp: , Rfl:     No Known Allergies    Social History   Substance Use Topics   • Smoking status: Never Smoker   • Smokeless tobacco: Former User   • Alcohol use No     Counseling given: Not Answered      Family History   Problem Relation Age of Onset   • Hypertension Mother    • Diabetes Father    • Hypertension Father    • Heart disease Other    • Stroke Other          Review of Systems   Constitutional: Negative.    HENT: Negative.    Eyes: Negative.    Respiratory: Negative.    Cardiovascular: Negative.    Gastrointestinal: Negative.    Endocrine: Negative.    Genitourinary: Negative.    Musculoskeletal: Negative.    Skin: Negative.    Allergic/Immunologic: Negative.    Neurological: Negative.    Hematological: Negative.    Psychiatric/Behavioral: Negative.        BP 94/60  Ht 64\" (162.6 cm)  Wt 119 lb 11.2 oz (54.3 kg)  Breastfeeding? No  BMI 20.55 kg/m2    Physical Exam   Constitutional: She is oriented to person, place, and time. She appears well-developed and well-nourished. No distress.   Neck: Normal range of motion. Neck supple. No JVD present. No tracheal deviation present. No thyromegaly present.   Cardiovascular: Normal rate, regular rhythm, normal heart sounds and intact distal pulses.  Exam reveals no gallop and no friction rub.    No murmur heard.  Pulmonary/Chest: Effort normal and breath sounds normal. No stridor. No respiratory distress. She has no wheezes. She has no rales. She exhibits no tenderness.   Abdominal: Soft. Bowel sounds are normal. She exhibits no distension and no mass. There is no tenderness. There is no rebound and no guarding. No hernia.   Genitourinary: Vagina normal and uterus normal. No vaginal discharge found.   Genitourinary Comments: Cuff wnl.  Pap " done.    Musculoskeletal: Normal range of motion. She exhibits no edema, tenderness or deformity.   Lymphadenopathy:     She has no cervical adenopathy.   Neurological: She is alert and oriented to person, place, and time.   Skin: Skin is warm and dry. No rash noted. She is not diaphoretic. No erythema. No pallor.   Breasts wnl bilaterally   Psychiatric: She has a normal mood and affect. Her behavior is normal. Judgment and thought content normal.   Nursing note and vitals reviewed.       As part of wellness and prevention, the following topics were discussed with the patient:    []  Nutrition  [x]  Physical activity/regular exercise   []  Healthy weight  []  Injury prevention  []  Substance misuse/abuse  []  Sexual behavior  []  STD prevention  []  Contaception  []  Dental health  []  Mental health  []  Immunization  [x]  Encouraged SBE     Counseling and guidance done:  Nutrition, physical activity, healthy weight, injury prevention, misuse of tobacco, alcohol and drugs, sexual behavior and STDs, contraception, dental health, mental health, immunizations breast cancer screening and exams.      Assessment     1) GYN annual well woman exam.   2) PAP done today? yes       Plan     1) Breast Health - Clinical breast exam yearly, Self breast awareness monthly  2) Repeat Pap - 1 yr  3) If smoker; Counseling given: Not Answered    4) Seat belts recommended  5) Follow up prn and one year.    Ani was seen today for gynecologic exam.    Diagnoses and all orders for this visit:    Screening for condition  -     POC Urinalysis Dipstick    Special screening examination for human papillomavirus (HPV)  -     Pap IG, HPV-hr - ThinPrep Vial, Cervix    Ductal carcinoma in situ (DCIS) of right breast        RTO Return in about 1 year (around 10/31/2018) for Annual physical.    Floyd Romero MD    10/31/2017  11:26 AM

## 2017-11-04 LAB
CYTOLOGIST CVX/VAG CYTO: NORMAL
CYTOLOGY CVX/VAG DOC THIN PREP: NORMAL
DX ICD CODE: NORMAL
HIV 1 & 2 AB SER-IMP: NORMAL
HPV I/H RISK 1 DNA CVX QL PROBE+SIG AMP: NEGATIVE
OTHER STN SPEC: NORMAL
PATH REPORT.FINAL DX SPEC: NORMAL
STAT OF ADQ CVX/VAG CYTO-IMP: NORMAL

## 2018-09-27 ENCOUNTER — APPOINTMENT (OUTPATIENT)
Dept: WOMENS IMAGING | Facility: HOSPITAL | Age: 63
End: 2018-09-27

## 2018-09-27 PROCEDURE — 77067 SCR MAMMO BI INCL CAD: CPT | Performed by: RADIOLOGY

## 2018-09-27 PROCEDURE — 77063 BREAST TOMOSYNTHESIS BI: CPT | Performed by: RADIOLOGY

## 2018-11-28 ENCOUNTER — OFFICE VISIT (OUTPATIENT)
Dept: OBSTETRICS AND GYNECOLOGY | Facility: CLINIC | Age: 63
End: 2018-11-28

## 2018-11-28 VITALS
DIASTOLIC BLOOD PRESSURE: 56 MMHG | HEIGHT: 64 IN | SYSTOLIC BLOOD PRESSURE: 98 MMHG | WEIGHT: 116 LBS | BODY MASS INDEX: 19.81 KG/M2

## 2018-11-28 DIAGNOSIS — D05.11 DUCTAL CARCINOMA IN SITU (DCIS) OF RIGHT BREAST: ICD-10-CM

## 2018-11-28 DIAGNOSIS — Z01.419 WELL FEMALE EXAM WITH ROUTINE GYNECOLOGICAL EXAM: Primary | ICD-10-CM

## 2018-11-28 DIAGNOSIS — Z90.710 H/O: HYSTERECTOMY: ICD-10-CM

## 2018-11-28 DIAGNOSIS — E03.9 HYPOTHYROIDISM, UNSPECIFIED TYPE: ICD-10-CM

## 2018-11-28 DIAGNOSIS — Z01.419 WELL WOMAN EXAM: ICD-10-CM

## 2018-11-28 LAB
BILIRUB BLD-MCNC: NEGATIVE MG/DL
CLARITY, POC: CLEAR
COLOR UR: YELLOW
GLUCOSE UR STRIP-MCNC: NEGATIVE MG/DL
KETONES UR QL: NEGATIVE
LEUKOCYTE EST, POC: NEGATIVE
NITRITE UR-MCNC: NEGATIVE MG/ML
PH UR: 6 [PH] (ref 5–8)
PROT UR STRIP-MCNC: NEGATIVE MG/DL
RBC # UR STRIP: NEGATIVE /UL
SP GR UR: 1.01 (ref 1–1.03)
UROBILINOGEN UR QL: NORMAL

## 2018-11-28 PROCEDURE — 99396 PREV VISIT EST AGE 40-64: CPT | Performed by: OBSTETRICS & GYNECOLOGY

## 2018-11-28 PROCEDURE — 81002 URINALYSIS NONAUTO W/O SCOPE: CPT | Performed by: OBSTETRICS & GYNECOLOGY

## 2018-11-28 RX ORDER — SULFAMETHOXAZOLE AND TRIMETHOPRIM 800; 160 MG/1; MG/1
TABLET ORAL
COMMUNITY
Start: 2018-08-23 | End: 2018-11-28

## 2018-11-28 RX ORDER — LEVOTHYROXINE SODIUM 0.07 MG/1
TABLET ORAL
COMMUNITY
Start: 2018-11-14

## 2018-11-28 NOTE — PROGRESS NOTES
GYN Annual Exam     CC- Here for annual exam.     Pt new to practice? no  Pt new to me? no     HPI: History of Present Illness      Ani Chandler is a 63 y.o. female who presents for annual well woman exam. No LMP recorded. Patient has had a hysterectomy.      Problems:  Patient Active Problem List   Diagnosis   • Ductal carcinoma in situ (DCIS) of right breast   • Hypothyroidism   • H/O: hysterectomy   ; otherwise none    OB History     No data available            Past Medical History:   Diagnosis Date   • Cancer (CMS/HCC)     Breast   • Depression     with anxiety   • GERD (gastroesophageal reflux disease)    • Hypothyroidism    • Knee pain, acute    • Menopausal symptoms    • Osteopenia    • Osteoporosis    • Pancreatitis     2002   • Polyp of sigmoid colon    • Thyroid disease        Past Surgical History:   Procedure Laterality Date   • BREAST SURGERY     • CATARACT EXTRACTION     • COLONOSCOPY W/ POLYPECTOMY     • HYSTERECTOMY      In 1997 for a cyst   • KNEE ARTHROSCOPY MENISCUS TRANSPLANT Right          Current Outpatient Medications:   •  alendronate (FOSAMAX) 70 MG tablet, TAKE ONE TABLET BY MOUTH WEEKLY, Disp: , Rfl: 11  •  aspirin 81 MG tablet, Take 81 mg by mouth daily., Disp: , Rfl:   •  Bioflavonoid Products (MARCO-C) tablet, Take 2,000 mg by mouth daily., Disp: , Rfl:   •  Cholecalciferol (VITAMIN D3) 2000 UNITS capsule, Take  by mouth daily. liquid, Disp: , Rfl:   •  levothyroxine (SYNTHROID, LEVOTHROID) 75 MCG tablet, , Disp: , Rfl:   •  Multiple Vitamin (MULTIVITAMINS PO), Take  by mouth daily., Disp: , Rfl:   •  Omega-3 Fatty Acids (OMEGA 3 PO), Take 2,000 mg by mouth daily., Disp: , Rfl:   •  Probiotic capsule, Take  by mouth daily., Disp: , Rfl:     No Known Allergies    Social History     Tobacco Use   • Smoking status: Never Smoker   • Smokeless tobacco: Former User   Substance Use Topics   • Alcohol use: No   • Drug use: No     Counseling given: Not Answered      Family History   Problem  "Relation Age of Onset   • Hypertension Mother    • Diabetes Father    • Hypertension Father    • Heart disease Other    • Stroke Other        Review of Systems    BP 98/56   Ht 162 cm (63.78\")   Wt 52.6 kg (116 lb)   BMI 20.05 kg/m²     Physical Exam   Constitutional: She is oriented to person, place, and time. She appears well-developed and well-nourished.   HENT:   Head: Normocephalic and atraumatic.   Eyes: EOM are normal.   Neck: Normal range of motion. Neck supple. No thyromegaly present.   Cardiovascular: Normal rate and regular rhythm.   Pulmonary/Chest: Effort normal and breath sounds normal. Right breast exhibits no mass and no nipple discharge. Left breast exhibits no mass and no nipple discharge. Breasts are symmetrical. There is no breast swelling.   Abdominal: Soft. Bowel sounds are normal. She exhibits no distension and no mass. There is no tenderness. There is no rebound and no guarding.   Genitourinary: Vagina normal. No breast tenderness, discharge or bleeding. Pelvic exam was performed with patient prone. There is no lesion on the right labia. There is no lesion on the left labia. Cervix exhibits no motion tenderness and no discharge. Right adnexum displays no mass. Left adnexum displays no mass.   Genitourinary Comments: Cuff wnl.  No pap   Musculoskeletal: Normal range of motion. She exhibits no edema.   Neurological: She is alert and oriented to person, place, and time.   Skin: Skin is warm and dry.   Breasts wnl bilaterally   Psychiatric: She has a normal mood and affect. Her behavior is normal. Judgment and thought content normal.   Nursing note and vitals reviewed.         As part of wellness and prevention, the following topics were discussed with the patient:  []  Nutrition  []  Physical activity/regular exercise   []  Healthy weight  []  Injury prevention  []  Substance misuse/abuse  []  Sexual behavior  []  STD prevention  []  Contaception  []  Dental health  []  Mental health  []  " Immunization  [x]  Encouraged SBE     Counseling and guidance done:  Nutrition, physical activity, healthy weight, injury prevention, misuse of tobacco, alcohol and drugs, sexual behavior and STDs, contraception, dental health, mental health, immunizations breast cancer screening and exams.    Assessment     1) GYN annual well woman exam.   2) PAP done today? no  3) problems: none       Plan       Follow up prn and one year.    Ani was seen today for gynecologic exam.    Diagnoses and all orders for this visit:    Well female exam with routine gynecological exam  -     POC Urinalysis Dipstick    Well woman exam    Ductal carcinoma in situ (DCIS) of right breast    Hypothyroidism, unspecified type    H/O: hysterectomy        RTO Return in about 1 year (around 11/28/2019) for Annual physical.        Floyd Romero MD    11/28/2018  10:53 AM

## 2019-04-11 NOTE — PLAN OF CARE
Problem: GI Endoscopy (Adult)  Goal: Signs and Symptoms of Listed Potential Problems Will be Absent or Manageable (GI Endoscopy)  Outcome: Ongoing (interventions implemented as appropriate)    08/11/17 0769   GI Endoscopy   Problems Assessed (GI Endoscopy) all   Problems Present (GI Endoscopy) none            Controlled with current regime

## 2019-10-01 ENCOUNTER — APPOINTMENT (OUTPATIENT)
Dept: WOMENS IMAGING | Facility: HOSPITAL | Age: 64
End: 2019-10-01

## 2019-10-01 PROCEDURE — 77067 SCR MAMMO BI INCL CAD: CPT | Performed by: RADIOLOGY

## 2019-10-01 PROCEDURE — 77063 BREAST TOMOSYNTHESIS BI: CPT | Performed by: RADIOLOGY

## 2019-12-04 ENCOUNTER — OFFICE VISIT (OUTPATIENT)
Dept: OBSTETRICS AND GYNECOLOGY | Facility: CLINIC | Age: 64
End: 2019-12-04

## 2019-12-04 VITALS — DIASTOLIC BLOOD PRESSURE: 68 MMHG | WEIGHT: 112.9 LBS | BODY MASS INDEX: 19.51 KG/M2 | SYSTOLIC BLOOD PRESSURE: 118 MMHG

## 2019-12-04 DIAGNOSIS — Z01.419 WELL WOMAN EXAM: ICD-10-CM

## 2019-12-04 DIAGNOSIS — D05.11 DUCTAL CARCINOMA IN SITU (DCIS) OF RIGHT BREAST: ICD-10-CM

## 2019-12-04 DIAGNOSIS — Z11.51 SPECIAL SCREENING EXAMINATION FOR HUMAN PAPILLOMAVIRUS (HPV): ICD-10-CM

## 2019-12-04 DIAGNOSIS — E03.9 HYPOTHYROIDISM, UNSPECIFIED TYPE: ICD-10-CM

## 2019-12-04 DIAGNOSIS — Z01.419 PAP SMEAR, LOW-RISK: Primary | ICD-10-CM

## 2019-12-04 DIAGNOSIS — Z90.710 H/O: HYSTERECTOMY: ICD-10-CM

## 2019-12-04 PROCEDURE — 99396 PREV VISIT EST AGE 40-64: CPT | Performed by: OBSTETRICS & GYNECOLOGY

## 2019-12-04 NOTE — PROGRESS NOTES
GYN Annual Exam     CC- Here for annual exam.     Pt new to practice? No  Pt new to me? No     HPI: History of Present Illness      Ani Chandler is a 64 y.o. female who presents for annual well woman exam. No LMP recorded. Patient has had a hysterectomy.    Problems in addition to need for annual: wt loss.  Adressed.     MAMMOGRAM UP TO DATE IF AGE APPROPRIATE?  Yes    COLONOSCOPY UP TO DATE IF AGE APPROPRIATE? Yes    Fhx breast cancer? Yes personal history    Fhx ovarian cancer? No    Fhx colon cancer? No    Invitae testing offered? No      PMHX:  Patient Active Problem List   Diagnosis   • Ductal carcinoma in situ (DCIS) of right breast   • Hypothyroidism   • H/O: hysterectomy   ; otherwise none    OB History     No data available            Past Medical History:   Diagnosis Date   • Cancer (CMS/HCC)     Breast   • Depression     with anxiety   • GERD (gastroesophageal reflux disease)    • Hypothyroidism    • Knee pain, acute    • Menopausal symptoms    • Osteopenia    • Osteoporosis    • Pancreatitis     2002   • Polyp of sigmoid colon    • Thyroid disease        Past Surgical History:   Procedure Laterality Date   • BREAST SURGERY     • CATARACT EXTRACTION     • COLONOSCOPY N/A 8/11/2017    Procedure: COLONOSCOPY with polypectomy;  Surgeon: Juan Jose Reid MD;  Location: [facility];  Service:    • COLONOSCOPY W/ POLYPECTOMY     • HYSTERECTOMY      In 1997 for a cyst   • KNEE ARTHROSCOPY MENISCUS TRANSPLANT Right          Current Outpatient Medications:   •  alendronate (FOSAMAX) 70 MG tablet, TAKE ONE TABLET BY MOUTH WEEKLY, Disp: , Rfl: 11  •  aspirin 81 MG tablet, Take 81 mg by mouth daily., Disp: , Rfl:   •  Bioflavonoid Products (MARCO-C) 500-200-60 MG tablet, Take 500 mg by mouth 2 (Two) Times a Day., Disp: , Rfl:   •  Bioflavonoid Products (MARCO-C) tablet, Take 2,000 mg by mouth daily., Disp: , Rfl:   •  Cholecalciferol (VITAMIN D3) 2000 UNITS capsule, Take  by mouth daily. liquid, Disp: ,  Rfl:   •  levothyroxine (SYNTHROID, LEVOTHROID) 75 MCG tablet, , Disp: , Rfl:   •  Multiple Vitamin (MULTIVITAMINS PO), Take  by mouth daily., Disp: , Rfl:   •  Omega-3 Fatty Acids (OMEGA 3 PO), Take 2,000 mg by mouth daily., Disp: , Rfl:   •  Probiotic capsule, Take  by mouth daily., Disp: , Rfl:     No Known Allergies    Social History     Tobacco Use   • Smoking status: Never Smoker   • Smokeless tobacco: Former User   Substance Use Topics   • Alcohol use: No   • Drug use: No       Smoker: No    Family History   Problem Relation Age of Onset   • Hypertension Mother    • Diabetes Father    • Hypertension Father    • Heart disease Other    • Stroke Other        Review of Systems        EXAM:  /68   Wt 51.2 kg (112 lb 14.4 oz)   Breastfeeding? No   BMI 19.51 kg/m²     Physical Exam   Constitutional: She is oriented to person, place, and time. She appears well-developed and well-nourished. No distress.   HENT:   Head: Normocephalic and atraumatic.   Eyes: EOM are normal.   Neck: Normal range of motion.   Cardiovascular: Normal rate.   Pulmonary/Chest: Effort normal.   Abdominal: Soft. She exhibits no distension and no mass. There is no tenderness. There is no guarding.   Genitourinary: Vagina normal and uterus normal. No vaginal discharge found.   Genitourinary Comments: Cuff wnl,  Pap done.  Urethral caruncle noted   Musculoskeletal: Normal range of motion. She exhibits no edema, tenderness or deformity.   Neurological: She is alert and oriented to person, place, and time.   Skin: Skin is warm and dry. No rash noted. She is not diaphoretic. No erythema. No pallor.   Breasts examined bilaterally.  No masses   Psychiatric: She has a normal mood and affect. Her behavior is normal. Judgment and thought content normal.   Nursing note and vitals reviewed.         As part of wellness and prevention, the following topics were discussed with the patient:  []  Nutrition  []  Physical activity/regular exercise   [x]   Healthy weight  []  Injury prevention  [x]  Substance misuse/abuse  []  Sexual behavior  []  STD prevention  []  Contaception  []  Dental health  [x]  Mental health  []  Immunization  [x]  Encouraged SBE     Counseling and guidance done:  Nutrition, physical activity, healthy weight, injury prevention, misuse of tobacco, alcohol and drugs, sexual behavior and STDs, contraception, dental health, mental health, immunizations breast cancer screening and exams.    Assessment     1) GYN annual well woman exam.   2) PAP done today? Yes  3) problems addressed: wt loss.         Plan       Follow up prn or one year.    Ani was seen today for gynecologic exam.    Diagnoses and all orders for this visit:    Ductal carcinoma in situ (DCIS) of right breast    H/O: hysterectomy    Hypothyroidism, unspecified type    Well woman exam        RTO Return in about 1 year (around 12/4/2020) for Annual physical.    Referral to nurse practitioner for unexplained wt loss with history of breast cancer.       Floyd Romero MD  [unfilled]  10:01 AM

## 2019-12-11 LAB
CYTOLOGIST CVX/VAG CYTO: NORMAL
CYTOLOGY CVX/VAG DOC CYTO: NORMAL
CYTOLOGY CVX/VAG DOC THIN PREP: NORMAL
DX ICD CODE: NORMAL
HIV 1 & 2 AB SER-IMP: NORMAL
HPV I/H RISK 1 DNA CVX QL PROBE+SIG AMP: NORMAL
HPV I/H RISK 4 DNA CVX QL PROBE+SIG AMP: NEGATIVE
Lab: NORMAL
OTHER STN SPEC: NORMAL
STAT OF ADQ CVX/VAG CYTO-IMP: NORMAL

## 2020-06-24 ENCOUNTER — PREP FOR SURGERY (OUTPATIENT)
Dept: OTHER | Facility: HOSPITAL | Age: 65
End: 2020-06-24

## 2020-06-24 ENCOUNTER — TELEPHONE (OUTPATIENT)
Dept: GASTROENTEROLOGY | Facility: CLINIC | Age: 65
End: 2020-06-24

## 2020-06-24 DIAGNOSIS — Z12.11 ENCOUNTER FOR SCREENING FOR MALIGNANT NEOPLASM OF COLON: Primary | ICD-10-CM

## 2020-06-24 NOTE — TELEPHONE ENCOUNTER
FAST TRACK (IN MEDIA) - 3 YEAR RECALL - LAST COLONOSCOPY 08/11/2017 - PERSONAL HISTORY POLYPS - 2 DAY PREP - SCHEDULE AT [place].

## 2020-06-26 PROBLEM — Z12.11 ENCOUNTER FOR SCREENING FOR MALIGNANT NEOPLASM OF COLON: Status: ACTIVE | Noted: 2020-06-26

## 2020-06-26 NOTE — TELEPHONE ENCOUNTER
CALLED AND SPOKE WITH PATIENT.  SCHEDULE  AT [place] 10/05/2020 AT 9:30AM - ARRIVE 8:30AM.  WILL MAIL INSTRUCTIONS.

## 2020-09-28 ENCOUNTER — TRANSCRIBE ORDERS (OUTPATIENT)
Dept: ADMINISTRATIVE | Facility: HOSPITAL | Age: 65
End: 2020-09-28

## 2020-09-28 DIAGNOSIS — U07.1 COVID-19: Primary | ICD-10-CM

## 2020-10-02 ENCOUNTER — LAB (OUTPATIENT)
Dept: LAB | Facility: HOSPITAL | Age: 65
End: 2020-10-02

## 2020-10-02 ENCOUNTER — ANESTHESIA EVENT (OUTPATIENT)
Dept: PERIOP | Facility: HOSPITAL | Age: 65
End: 2020-10-02

## 2020-10-02 DIAGNOSIS — U07.1 COVID-19: ICD-10-CM

## 2020-10-02 PROCEDURE — U0004 COV-19 TEST NON-CDC HGH THRU: HCPCS

## 2020-10-02 PROCEDURE — C9803 HOPD COVID-19 SPEC COLLECT: HCPCS

## 2020-10-03 LAB — SARS-COV-2 RNA NOSE QL NAA+PROBE: NOT DETECTED

## 2020-10-05 ENCOUNTER — HOSPITAL ENCOUNTER (OUTPATIENT)
Facility: HOSPITAL | Age: 65
Setting detail: HOSPITAL OUTPATIENT SURGERY
Discharge: HOME | End: 2020-10-05
Attending: INTERNAL MEDICINE | Admitting: INTERNAL MEDICINE

## 2020-10-05 ENCOUNTER — ANESTHESIA (OUTPATIENT)
Dept: PERIOP | Facility: HOSPITAL | Age: 65
End: 2020-10-05

## 2020-10-05 VITALS
BODY MASS INDEX: 19.02 KG/M2 | WEIGHT: 111.4 LBS | HEIGHT: 64 IN | RESPIRATION RATE: 16 BRPM | SYSTOLIC BLOOD PRESSURE: 116 MMHG | HEART RATE: 64 BPM | DIASTOLIC BLOOD PRESSURE: 70 MMHG | OXYGEN SATURATION: 99 % | TEMPERATURE: 98.4 F

## 2020-10-05 DIAGNOSIS — Z12.11 ENCOUNTER FOR SCREENING FOR MALIGNANT NEOPLASM OF COLON: ICD-10-CM

## 2020-10-05 PROCEDURE — 45378 DIAGNOSTIC COLONOSCOPY: CPT | Performed by: INTERNAL MEDICINE

## 2020-10-05 PROCEDURE — 25010000002 PROPOFOL 10 MG/ML EMULSION: Performed by: ANESTHESIOLOGY

## 2020-10-05 RX ORDER — SODIUM CHLORIDE, SODIUM LACTATE, POTASSIUM CHLORIDE, CALCIUM CHLORIDE 600; 310; 30; 20 MG/100ML; MG/100ML; MG/100ML; MG/100ML
9 INJECTION, SOLUTION INTRAVENOUS CONTINUOUS PRN
Status: DISCONTINUED | OUTPATIENT
Start: 2020-10-05 | End: 2020-10-05 | Stop reason: HOSPADM

## 2020-10-05 RX ORDER — SODIUM CHLORIDE 0.9 % (FLUSH) 0.9 %
10 SYRINGE (ML) INJECTION EVERY 12 HOURS SCHEDULED
Status: DISCONTINUED | OUTPATIENT
Start: 2020-10-05 | End: 2020-10-05 | Stop reason: HOSPADM

## 2020-10-05 RX ORDER — PROPOFOL 10 MG/ML
VIAL (ML) INTRAVENOUS AS NEEDED
Status: DISCONTINUED | OUTPATIENT
Start: 2020-10-05 | End: 2020-10-05 | Stop reason: SURG

## 2020-10-05 RX ORDER — LIDOCAINE HYDROCHLORIDE 10 MG/ML
0.5 INJECTION, SOLUTION EPIDURAL; INFILTRATION; INTRACAUDAL; PERINEURAL ONCE AS NEEDED
Status: DISCONTINUED | OUTPATIENT
Start: 2020-10-05 | End: 2020-10-05 | Stop reason: HOSPADM

## 2020-10-05 RX ORDER — SODIUM CHLORIDE 9 MG/ML
40 INJECTION, SOLUTION INTRAVENOUS AS NEEDED
Status: DISCONTINUED | OUTPATIENT
Start: 2020-10-05 | End: 2020-10-05 | Stop reason: HOSPADM

## 2020-10-05 RX ORDER — SODIUM CHLORIDE 0.9 % (FLUSH) 0.9 %
10 SYRINGE (ML) INJECTION AS NEEDED
Status: DISCONTINUED | OUTPATIENT
Start: 2020-10-05 | End: 2020-10-05 | Stop reason: HOSPADM

## 2020-10-05 RX ADMIN — PROPOFOL 360 MG: 10 INJECTION, EMULSION INTRAVENOUS at 09:31

## 2020-10-05 NOTE — H&P
"Patient Care Team:  Juanito Moore MD as PCP - General (Family Medicine)    CHIEF COMPLAINT: Personal hx colon polyps    HISTORY OF PRESENT ILLNESS:  Last exam was 2017    Past Medical History:   Diagnosis Date   • Cancer (CMS/HCC)     Breast   • Depression     with anxiety   • GERD (gastroesophageal reflux disease)    • Hypothyroidism    • Knee pain, acute    • Menopausal symptoms    • Osteopenia    • Osteoporosis    • Pancreatitis     2002   • Polyp of sigmoid colon    • Thyroid disease      Past Surgical History:   Procedure Laterality Date   • BREAST SURGERY     • CATARACT EXTRACTION     • COLONOSCOPY N/A 8/11/2017    Procedure: COLONOSCOPY with polypectomy;  Surgeon: Juan Jose Reid MD;  Location: [facility];  Service:    • COLONOSCOPY W/ POLYPECTOMY     • HYSTERECTOMY      In 1997 for a cyst   • KNEE ARTHROSCOPY MENISCUS TRANSPLANT Right      Family History   Problem Relation Age of Onset   • Hypertension Mother    • Diabetes Father    • Hypertension Father    • Heart disease Other    • Stroke Other      Social History     Tobacco Use   • Smoking status: Never Smoker   • Smokeless tobacco: Former User   Substance Use Topics   • Alcohol use: No   • Drug use: No     Medications Prior to Admission   Medication Sig Dispense Refill Last Dose   • aspirin 81 MG tablet Take 81 mg by mouth daily.   9/30/2020   • levothyroxine (SYNTHROID, LEVOTHROID) 75 MCG tablet    10/4/2020     Allergies:  Patient has no known allergies.    REVIEW OF SYSTEMS:  Please see the above history of present illness for pertinent positives and negatives.  The remainder of the patient's systems have been reviewed and are negative.     Vital Signs  Temp:  [97.9 °F (36.6 °C)] 97.9 °F (36.6 °C)  Heart Rate:  [70] 70  Resp:  [12] 12  BP: (115)/(68) 115/68    Flowsheet Rows      First Filed Value   Admission Height  162.6 cm (64\") Documented at 10/05/2020 0815   Admission Weight  50.5 kg (111 lb 6.4 oz) Documented at 10/05/2020 0815 "           Physical Exam:  Physical Exam   Constitutional: Patient appears well-developed and well-nourished and in no acute distress   HEENT:   Head: Normocephalic and atraumatic.   Eyes:  Pupils are equal, round, and reactive to light. EOM are intact. Sclerae are anicteric and non-injected.  Mouth and Throat: Patient has moist mucous membranes. Oropharynx is clear of any erythema or exudate.     Neck: Neck supple. No JVD present. No thyromegaly present. No lymphadenopathy present.  Cardiovascular: Regular rate, regular rhythm, S1 normal and S2 normal.  Exam reveals no gallop and no friction rub.  No murmur heard.  Pulmonary/Chest: Lungs are clear to auscultation bilaterally. No respiratory distress. No wheezes. No rhonchi. No rales.   Abdominal: Soft. Bowel sounds are normal. No distension and no mass. There is no hepatosplenomegaly. There is no tenderness.   Musculoskeletal: Normal Muscle tone  Extremities: No edema. Pulses are palpable in all 4 extremities.  Neurological: Patient is alert and oriented to person, place, and time. Cranial nerves II-XII are grossly intact with no focal deficits.  Skin: Skin is warm. No rash noted. Nails show no clubbing.  No cyanosis or erythema.    Debilities/Disabilities Identified: None  Emotional Behavior: Appropriate     Results Review:    I reviewed the patient's new clinical results.  Lab Results (most recent)     None          Imaging Results (Most Recent)     None        reviewed    ECG/EMG Results (most recent)     None        reviewed    Assessment/Plan   Personal hx colon polyps/  colonoscopy      I discussed the patients findings and my recommendations with patient.     Juan Jose Reid MD  10/05/20  08:41 EDT    Time: 10 min prior to procedure.

## 2020-10-05 NOTE — OP NOTE
COLONOSCOPY  Procedure Report    Patient Name:  Ani Chandler  YOB: 1955    Date of Surgery:  10/5/2020     Indications:  Encounter for screening for malignant neoplasm of colon [Z12.11]      Pre-op Diagnosis:   Encounter for screening for malignant neoplasm of colon [Z12.11]    Post-Op Diagnosis Codes:     * Encounter for screening for malignant neoplasm of colon [Z12.11]     * Personal history of colonic polyps [Z86.010]         Procedure/CPT® Codes:      Procedure(s):  COLONOSCOPY    Staff:  Surgeon(s):  Juan Jose Reid MD         Anesthesia: Monitored Anesthesia Care    Estimated Blood Loss: none    Specimens: None    Implants:    Nothing was implanted during the procedure      Description of Procedure: After having signed informed consent, she was brought to the endoscopy suite, placed in left lateral decubitus position and given her IV sedation. Rectal exam revealed no external lesions, normal anal tone, no rectal mass. The scope was introduced in the rectum and advanced under direct visualization through only a fair to poorly prepped colon. There was a significant amount of opaque liquid stool present as well as particulate matter that prohibited clearing all of the remaining bowel contents. The scope was advanced through a tortuous sigmoid colon, descending colon, to and around the splenic flexure; reduced; advanced in the transverse colon with significant looping, to and around eventually the hepatic flexure. The scope was reduced in the ascending colon which was tortuous as well. The scope was reduced using abdominal splinting, eventually abdominal splinting by both the nurse and the tech, the scope could be reduced and then advanced into the cecum. The cecum was fairly well-prepped and identified by the appendiceal orifice and the ileocecal valve. The ileocecal valve was not intubated. The cecum was normal-appearing. The scope was withdrawn through the colon. Attempting to  clear as much of the secretions as could be cleared, this did clog the scope several times. Flushes were used in attempts of improving the bowel prep. With this, the scope was withdrawn. No mucosal lesions were noted throughout the entire exam and no diverticular openings were encountered. Within the rectum the scope was retroflexed revealing intact dentate line and no mucosal lesions. The scope was deretroflexed and withdrawn. The patient tolerated the lengthy procedure well.           Findings: Difficult Colonoscopy to Cecum with Poor Prep  Normal Mucosa     Complications: None    Recommendations: Repeat in 5 years with 2 day Prep      Juan Jose Reid MD     Date: 10/5/2020  Time: 10:00 EDT

## 2020-10-05 NOTE — ANESTHESIA PREPROCEDURE EVALUATION
Anesthesia Evaluation     Patient summary reviewed and Nursing notes reviewed   no history of anesthetic complications:  NPO Solid Status: > 8 hours  NPO Liquid Status: > 8 hours           Airway   Mallampati: II  TM distance: <3 FB  Neck ROM: full  Possible difficult intubation and Small opening  Dental - normal exam     Pulmonary - negative pulmonary ROS and normal exam   Cardiovascular - negative cardio ROS and normal exam  Exercise tolerance: good (4-7 METS)    Rhythm: regular  Rate: normal        Neuro/Psych- negative ROS  GI/Hepatic/Renal/Endo    (+)   thyroid problem hypothyroidism  (-) GERD    Musculoskeletal (-) negative ROS    Abdominal  - normal exam   Substance History - negative use     OB/GYN          Other      history of cancer (Breast) remission                    Anesthesia Plan    ASA 2     MAC       Anesthetic plan, all risks, benefits, and alternatives have been provided, discussed and informed consent has been obtained with: patient.  Use of blood products discussed with patient  Consented to blood products.

## 2020-10-05 NOTE — BRIEF OP NOTE
COLONOSCOPY  Progress Note    Ani hCandler  10/5/2020    Pre-op Diagnosis:   Encounter for screening for malignant neoplasm of colon [Z12.11]       Post-Op Diagnosis Codes:     * Encounter for screening for malignant neoplasm of colon [Z12.11]     * Personal history of colonic polyps [Z86.010]    Procedure/CPT® Codes:        Procedure(s):  COLONOSCOPY    Surgeon(s):  Juan Jose Reid MD    Anesthesia: Monitored Anesthesia Care    Staff:   Circulator: Kimberly Caro RN  Scrub Person: Louise Ceron  Other: Sara Brand RN         Estimated Blood Loss: none    Urine Voided: * No values recorded between 10/5/2020  9:27 AM and 10/5/2020  9:58 AM *    Specimens:                None          Drains: * No LDAs found *    Findings: Difficult Colonoscopy to Cecum with Poor Prep  Normal Mucosa    Complications: None          Juan Jose Reid MD     Date: 10/5/2020  Time: 10:00 EDT

## 2020-10-05 NOTE — ANESTHESIA POSTPROCEDURE EVALUATION
Patient: Ani Chandler    Procedure Summary     Date: 10/05/20 Room / Location: [facility] ENDOSCOPY 1 /  [facility] OR    Anesthesia Start: 0927 Anesthesia Stop: 1000    Procedure: COLONOSCOPY (N/A ) Diagnosis:       Encounter for screening for malignant neoplasm of colon      Personal history of colonic polyps      (Encounter for screening for malignant neoplasm of colon [Z12.11])    Surgeon: Juan Jose Reid MD Provider: Yaa Pandey MD    Anesthesia Type: MAC ASA Status: 2          Anesthesia Type: MAC    Vitals  Vitals Value Taken Time   /71 10/05/20 1058   Temp 98.4 °F (36.9 °C) 10/05/20 1005   Pulse 63 10/05/20 1058   Resp 16 10/05/20 1058   SpO2 99 % 10/05/20 1058           Post Anesthesia Care and Evaluation    Patient location during evaluation: PHASE II  Patient participation: complete - patient participated  Level of consciousness: awake  Pain management: adequate  Airway patency: patent  Anesthetic complications: No anesthetic complications  PONV Status: none  Cardiovascular status: acceptable  Respiratory status: acceptable  Hydration status: acceptable

## 2020-10-07 ENCOUNTER — APPOINTMENT (OUTPATIENT)
Dept: WOMENS IMAGING | Facility: HOSPITAL | Age: 65
End: 2020-10-07

## 2020-10-07 PROCEDURE — 77067 SCR MAMMO BI INCL CAD: CPT | Performed by: RADIOLOGY

## 2020-10-07 PROCEDURE — 77063 BREAST TOMOSYNTHESIS BI: CPT | Performed by: RADIOLOGY

## 2020-12-09 ENCOUNTER — OFFICE VISIT (OUTPATIENT)
Dept: OBSTETRICS AND GYNECOLOGY | Facility: CLINIC | Age: 65
End: 2020-12-09

## 2020-12-09 VITALS
WEIGHT: 114.4 LBS | HEIGHT: 64 IN | DIASTOLIC BLOOD PRESSURE: 60 MMHG | SYSTOLIC BLOOD PRESSURE: 104 MMHG | BODY MASS INDEX: 19.53 KG/M2

## 2020-12-09 DIAGNOSIS — D05.11 DUCTAL CARCINOMA IN SITU (DCIS) OF RIGHT BREAST: ICD-10-CM

## 2020-12-09 DIAGNOSIS — Z13.9 SCREENING FOR CONDITION: Primary | ICD-10-CM

## 2020-12-09 DIAGNOSIS — Z90.710 H/O: HYSTERECTOMY: ICD-10-CM

## 2020-12-09 LAB
BILIRUB BLD-MCNC: NEGATIVE MG/DL
CLARITY, POC: CLEAR
COLOR UR: YELLOW
GLUCOSE UR STRIP-MCNC: NEGATIVE MG/DL
KETONES UR QL: NEGATIVE
LEUKOCYTE EST, POC: NEGATIVE
NITRITE UR-MCNC: NEGATIVE MG/ML
PH UR: 6 [PH] (ref 5–8)
PROT UR STRIP-MCNC: NEGATIVE MG/DL
RBC # UR STRIP: NEGATIVE /UL
SP GR UR: 1 (ref 1–1.03)
UROBILINOGEN UR QL: NORMAL

## 2020-12-09 PROCEDURE — 99397 PER PM REEVAL EST PAT 65+ YR: CPT | Performed by: OBSTETRICS & GYNECOLOGY

## 2020-12-09 PROCEDURE — 81002 URINALYSIS NONAUTO W/O SCOPE: CPT | Performed by: OBSTETRICS & GYNECOLOGY

## 2020-12-09 NOTE — PROGRESS NOTES
GYN Annual Exam     CC- Here for annual exam.     Pt new to practice? No  Pt new to me? No     Ani Chandler is a 65 y.o. No obstetric history on file. female who presents for annual well woman exam. No LMP recorded (lmp unknown). Patient has had a hysterectomy.    Problems in addition to need for annual: none    HPI: History of Present Illness    PMHX:  Patient Active Problem List   Diagnosis   • Ductal carcinoma in situ (DCIS) of right breast   • Hypothyroidism   • H/O: hysterectomy   • Encounter for screening for malignant neoplasm of colon   ; otherwise none    OB History    No obstetric history on file.           Past Medical History:   Diagnosis Date   • Cancer (CMS/HCC)     Breast   • Depression     with anxiety   • GERD (gastroesophageal reflux disease)    • Hypothyroidism    • Knee pain, acute    • Menopausal symptoms    • Osteopenia    • Osteoporosis    • Pancreatitis     2002   • Polyp of sigmoid colon    • Thyroid disease        Past Surgical History:   Procedure Laterality Date   • BREAST SURGERY     • CATARACT EXTRACTION     • COLONOSCOPY N/A 8/11/2017    Procedure: COLONOSCOPY with polypectomy;  Surgeon: Juan Jose Reid MD;  Location: [facility] OR;  Service:    • COLONOSCOPY N/A 10/5/2020    Procedure: COLONOSCOPY;  Surgeon: Juan Jose Reid MD;  Location: [facility] OR;  Service: Gastroenterology;  Laterality: N/A;   • COLONOSCOPY W/ POLYPECTOMY     • HYSTERECTOMY      In 1997 for a cyst   • KNEE ARTHROSCOPY MENISCUS TRANSPLANT Right          Current Outpatient Medications:   •  aspirin 81 MG tablet, Take 81 mg by mouth daily., Disp: , Rfl:   •  levothyroxine (SYNTHROID, LEVOTHROID) 75 MCG tablet, , Disp: , Rfl:     No Known Allergies    Social History     Tobacco Use   • Smoking status: Never Smoker   • Smokeless tobacco: Former User   Substance Use Topics   • Alcohol use: No   • Drug use: No       Smoker: No    Family History   Problem Relation Age of Onset   • Hypertension Mother   "  • Diabetes Father    • Hypertension Father    • Heart disease Other    • Stroke Other        Review of Systems        EXAM:  /60   Ht 162.6 cm (64.02\")   Wt 51.9 kg (114 lb 6.4 oz)   LMP  (LMP Unknown)   Breastfeeding No   BMI 19.63 kg/m²     Physical Exam  Vitals signs and nursing note reviewed. Exam conducted with a chaperone present.   Constitutional:       General: She is not in acute distress.     Appearance: She is well-developed. She is not diaphoretic.   HENT:      Head: Normocephalic and atraumatic.      Nose: Nose normal.   Eyes:      Extraocular Movements: Extraocular movements intact.   Neck:      Musculoskeletal: Normal range of motion.   Cardiovascular:      Rate and Rhythm: Normal rate.   Pulmonary:      Effort: Pulmonary effort is normal.   Chest:      Breasts: Breasts are symmetrical.         Right: Normal. No mass, nipple discharge, skin change or tenderness.         Left: Normal. No mass, nipple discharge, skin change or tenderness.   Abdominal:      General: There is no distension.      Palpations: Abdomen is soft. There is no mass.      Tenderness: There is no abdominal tenderness. There is no guarding.   Genitourinary:     General: Normal vulva.      Pubic Area: No rash.       Vagina: Normal. No vaginal discharge.      Adnexa: Right adnexa normal and left adnexa normal.      Comments: No pap  Musculoskeletal: Normal range of motion.         General: No tenderness or deformity.   Lymphadenopathy:      Upper Body:      Right upper body: No axillary adenopathy.      Left upper body: No axillary adenopathy.   Skin:     General: Skin is warm and dry.      Coloration: Skin is not pale.      Findings: No erythema or rash.   Neurological:      Mental Status: She is alert and oriented to person, place, and time.   Psychiatric:         Behavior: Behavior normal.         Thought Content: Thought content normal.         Judgment: Judgment normal.            As part of wellness and prevention, " the following topics were discussed with the patient:  []  Nutrition  []  Physical activity/regular exercise   [x]  Healthy weight  []  Injury prevention  [x]  Substance misuse/abuse  []  Sexual behavior  []  STD prevention  []  Contaception  []  Dental health  [x]  Mental health  []  Immunization  [x]  Encouraged SBE     Counseling and guidance done:  Nutrition, physical activity, healthy weight, injury prevention, misuse of tobacco, alcohol and drugs, sexual behavior and STDs, contraception, dental health, mental health, immunizations breast cancer screening and exams.    Assessment     1) GYN annual well woman exam.   2) PAP done today? No  3) problems addressed: none    MAMMOGRAM UP TO DATE IF AGE APPROPRIATE?  Yes    COLONOSCOPY UP TO DATE IF AGE APPROPRIATE? Yes               Plan       Follow up prn or one year.    Diagnoses and all orders for this visit:    1. Screening for condition (Primary)  -     POC Urinalysis Dipstick    2. Ductal carcinoma in situ (DCIS) of right breast    3. H/O: hysterectomy        RTO Return in about 1 year (around 12/9/2021) for Annual physical.          Floyd Romero MD  [unfilled]  10:53 EST

## 2021-10-19 ENCOUNTER — APPOINTMENT (OUTPATIENT)
Dept: WOMENS IMAGING | Facility: HOSPITAL | Age: 66
End: 2021-10-19

## 2021-10-19 PROCEDURE — 77067 SCR MAMMO BI INCL CAD: CPT | Performed by: RADIOLOGY

## 2021-10-19 PROCEDURE — 77063 BREAST TOMOSYNTHESIS BI: CPT | Performed by: RADIOLOGY

## 2021-12-15 ENCOUNTER — OFFICE VISIT (OUTPATIENT)
Dept: OBSTETRICS AND GYNECOLOGY | Facility: CLINIC | Age: 66
End: 2021-12-15

## 2021-12-15 VITALS
WEIGHT: 116.6 LBS | BODY MASS INDEX: 19.91 KG/M2 | SYSTOLIC BLOOD PRESSURE: 100 MMHG | HEIGHT: 64 IN | DIASTOLIC BLOOD PRESSURE: 64 MMHG

## 2021-12-15 DIAGNOSIS — D05.11 DUCTAL CARCINOMA IN SITU (DCIS) OF RIGHT BREAST: ICD-10-CM

## 2021-12-15 DIAGNOSIS — Z01.419 WELL WOMAN EXAM: ICD-10-CM

## 2021-12-15 DIAGNOSIS — Z11.51 ENCOUNTER FOR SCREENING FOR HUMAN PAPILLOMAVIRUS (HPV): ICD-10-CM

## 2021-12-15 DIAGNOSIS — E46 PROTEIN-CALORIE MALNUTRITION, UNSPECIFIED SEVERITY (HCC): ICD-10-CM

## 2021-12-15 DIAGNOSIS — M21.619 BUNION: ICD-10-CM

## 2021-12-15 DIAGNOSIS — E03.1 CONGENITAL HYPOTHYROIDISM WITHOUT GOITER: ICD-10-CM

## 2021-12-15 DIAGNOSIS — Z01.419 PAP SMEAR, LOW-RISK: Primary | ICD-10-CM

## 2021-12-15 DIAGNOSIS — Z01.419 ROUTINE GYNECOLOGICAL EXAMINATION: ICD-10-CM

## 2021-12-15 DIAGNOSIS — Z90.710 H/O: HYSTERECTOMY: ICD-10-CM

## 2021-12-15 PROCEDURE — 99397 PER PM REEVAL EST PAT 65+ YR: CPT | Performed by: OBSTETRICS & GYNECOLOGY

## 2021-12-15 PROCEDURE — 81002 URINALYSIS NONAUTO W/O SCOPE: CPT | Performed by: OBSTETRICS & GYNECOLOGY

## 2021-12-15 RX ORDER — LEVOTHYROXINE SODIUM 0.07 MG/1
1 TABLET ORAL DAILY
COMMUNITY
Start: 2021-06-29

## 2021-12-15 NOTE — PROGRESS NOTES
GYN Annual Exam     CC- Here for annual exam.     Pt new to practice? No  Pt new to me? No     Ani Chandler is a 66 y.o. No obstetric history on file. female who presents for annual well woman exam. No LMP recorded (lmp unknown). Patient has had a hysterectomy.    Problems in addition to need for annual: none    HPI: History of Present Illness    PMHX:  Patient Active Problem List   Diagnosis   • Ductal carcinoma in situ (DCIS) of right breast: 2011   • Hypothyroidism   • H/O: hysterectomy   • Encounter for screening for malignant neoplasm of colon   ; otherwise none    OB History    No obstetric history on file.           Past Medical History:   Diagnosis Date   • Cancer (HCC)     Breast   • Depression     with anxiety   • GERD (gastroesophageal reflux disease)    • Hypothyroidism    • Knee pain, acute    • Menopausal symptoms    • Osteopenia    • Osteoporosis    • Pancreatitis     2002   • Polyp of sigmoid colon    • Thyroid disease        Past Surgical History:   Procedure Laterality Date   • BREAST SURGERY     • CATARACT EXTRACTION     • COLONOSCOPY N/A 8/11/2017    Procedure: COLONOSCOPY with polypectomy;  Surgeon: Juan Jose Reid MD;  Location: [facility] OR;  Service:    • COLONOSCOPY N/A 10/5/2020    Procedure: COLONOSCOPY;  Surgeon: Juan Jose Reid MD;  Location: [facility] OR;  Service: Gastroenterology;  Laterality: N/A;   • COLONOSCOPY W/ POLYPECTOMY     • HYSTERECTOMY      In 1997 for a cyst   • KNEE ARTHROSCOPY MENISCUS TRANSPLANT Right          Current Outpatient Medications:   •  levothyroxine (SYNTHROID, LEVOTHROID) 75 MCG tablet, Take 1 tablet by mouth Daily., Disp: , Rfl:   •  aspirin 81 MG tablet, Take 81 mg by mouth daily., Disp: , Rfl:   •  levothyroxine (SYNTHROID, LEVOTHROID) 75 MCG tablet, , Disp: , Rfl:     No Known Allergies    Social History     Tobacco Use   • Smoking status: Never Smoker   • Smokeless tobacco: Former User   Substance Use Topics   • Alcohol use: No   •  "Drug use: No       Ani MEKA Chandler  reports that she has never smoked. She has quit using smokeless tobacco..           Family History   Problem Relation Age of Onset   • Hypertension Mother    • Diabetes Father    • Hypertension Father    • Heart disease Other    • Stroke Other        Review of Systems    Patient reports that she is not currently experiencing any symptoms of urinary incontinence.      noTESTED FOR CHLAMYDIA?    EXAM:  /64   Ht 162.6 cm (64\")   Wt 52.9 kg (116 lb 9.6 oz)   LMP  (LMP Unknown)   Breastfeeding No   BMI 20.01 kg/m²     Labs:   Lab Results (last 24 hours)     ** No results found for the last 24 hours. **          Physical Exam  Vitals and nursing note reviewed. Exam conducted with a chaperone present.   Constitutional:       General: She is not in acute distress.     Appearance: She is well-developed. She is not diaphoretic.   HENT:      Head: Normocephalic and atraumatic.      Nose: Nose normal.   Eyes:      Extraocular Movements: Extraocular movements intact.   Cardiovascular:      Rate and Rhythm: Normal rate.   Pulmonary:      Effort: Pulmonary effort is normal.   Chest:   Breasts: Breasts are symmetrical.      Right: Normal. No mass, nipple discharge, skin change, tenderness or axillary adenopathy.      Left: Normal. No mass, nipple discharge, skin change, tenderness or axillary adenopathy.       Abdominal:      General: There is no distension.      Palpations: Abdomen is soft. There is no mass.      Tenderness: There is no abdominal tenderness. There is no guarding.   Genitourinary:     General: Normal vulva.      Pubic Area: No rash.       Vagina: Signs of injury present. No foreign body. No vaginal discharge, erythema, bleeding or prolapsed vaginal walls.      Uterus: Absent.       Adnexa: Right adnexa normal and left adnexa normal.        Right: No mass, tenderness or fullness.          Left: No mass, tenderness or fullness.        Rectum: Normal. No mass, tenderness, " anal fissure or external hemorrhoid.            Comments: Cuff wnl  Musculoskeletal:         General: No tenderness or deformity. Normal range of motion.      Cervical back: Normal range of motion.   Lymphadenopathy:      Upper Body:      Right upper body: No axillary adenopathy.      Left upper body: No axillary adenopathy.   Skin:     General: Skin is warm and dry.      Coloration: Skin is not pale.      Findings: No erythema or rash.   Neurological:      Mental Status: She is alert and oriented to person, place, and time.   Psychiatric:         Behavior: Behavior normal.         Thought Content: Thought content normal.         Judgment: Judgment normal.            As part of wellness and prevention, the following topics were discussed with the patient: healthy weight, substance abuse/misuse, mental health, encouraging self breast exam, and other counseling and guidance done:  Nutrition, physical activity, healthy weight, injury prevention, misuse of tobacco, alcohol and drugs, sexual behavior and STDs, contraception, dental health, mental health, immunizations breast cancer screening and exams.    I saw the patient with a face mask, gloves and eye protection  The patient herself was masked.  Social distancing was observed as appropriate. All COVID precautions observed.  Pt encouraged to receive COVID vaccine if she hadn't already done this.     Assessment     1) GYN annual well woman exam.   2) PAP done today? No  3) problems addressed: none    MAMMOGRAM UP TO DATE IF AGE APPROPRIATE?  Yes  (if no, pt encouraged to schedule; risks of undiagnosed breast cancer reviewed with pt if she does not have a mammogram)    COLONOSCOPY UP TO DATE IF AGE APPROPRIATE? Yes  (if no, risks of undiagnosed colon cancer reviewed with pt if she fails to have the colonoscopy)    Fhx breast cancer? personal    Fhx ovarian cancer? No    Fhx colon cancer? No    Advance Care Planning   ACP discussion was held with the patient during this  visit. Patient has an advance directive in EMR which is still valid.               Plan       Follow up prn or one year.    Diagnoses and all orders for this visit:    1. Pap smear, low-risk (Primary)  -     Pap IG (Image Guided)    2. Routine gynecological examination  -     POC Urinalysis Dipstick    3. Encounter for screening for human papillomavirus (HPV)  -     Pap IG (Image Guided)    4. hx: Ductal carcinoma in situ (DCIS) of right breast    5. H/O: hysterectomy    6. Congenital hypothyroidism without goiter    7. Well woman exam    8. Protein-calorie malnutrition, unspecified severity (HCC)    9. Bunion  -     Ambulatory Referral to Podiatry        RTO Return in about 1 year (around 12/15/2022) for Annual physical.      Floyd Romero MD  [unfilled]  20:49 EST

## 2021-12-20 LAB
CYTOLOGIST CVX/VAG CYTO: NORMAL
CYTOLOGY CVX/VAG DOC CYTO: NORMAL
CYTOLOGY CVX/VAG DOC THIN PREP: NORMAL
DX ICD CODE: NORMAL
HIV 1 & 2 AB SER-IMP: NORMAL
OTHER STN SPEC: NORMAL
STAT OF ADQ CVX/VAG CYTO-IMP: NORMAL

## 2022-05-27 ENCOUNTER — TELEPHONE (OUTPATIENT)
Dept: OBSTETRICS AND GYNECOLOGY | Facility: CLINIC | Age: 67
End: 2022-05-27

## 2022-05-27 NOTE — TELEPHONE ENCOUNTER
Caller: Ani Chandler    Relationship: Self    Best call back number: [phone]    What is the best time to reach you: ANYTIME    Who are you requesting to speak with (clinical staff, provider,  specific staff member): N/A    Do you know the name of the person who called: BUDDY    What was the call regarding: MISSED A CALL, VM SAID TO CALL BACK.     Do you require a callback: YES, OKAY TO LEAVE A VM

## 2022-12-07 ENCOUNTER — APPOINTMENT (OUTPATIENT)
Dept: WOMENS IMAGING | Facility: HOSPITAL | Age: 67
End: 2022-12-07

## 2022-12-07 PROCEDURE — 77067 SCR MAMMO BI INCL CAD: CPT | Performed by: RADIOLOGY

## 2022-12-07 PROCEDURE — 77063 BREAST TOMOSYNTHESIS BI: CPT | Performed by: RADIOLOGY

## 2023-01-02 PROBLEM — Z13.9 SCREENING FOR CONDITION: Status: ACTIVE | Noted: 2023-01-02

## 2023-01-02 NOTE — PROGRESS NOTES
GYN Annual Exam     CC- Here for annual exam   Chief Complaint   Patient presents with   • Gynecologic Exam       Pt new to practice? No  Pt new to me? No     Ani Chandler is a 67 y.o. No obstetric history on file. female who presents for annual well woman exam. No LMP recorded (lmp unknown). Patient has had a hysterectomy.    Problems in addition to need for annual:   1. Bunion left foot  2. Arthritis in both thumb bases    HPI: History of Present Illness    PMHX:  Patient Active Problem List   Diagnosis   • Ductal carcinoma in situ (DCIS) of right breast: 2011   • Hypothyroidism   • H/O: hysterectomy   • Encounter for screening for malignant neoplasm of colon   • Screening for condition   • Bunion of left foot   • Arthritis of hand   ; otherwise none    OB History    No obstetric history on file.           Past Medical History:   Diagnosis Date   • Cancer (HCC)     Breast   • Depression     with anxiety   • GERD (gastroesophageal reflux disease)    • Hypothyroidism    • Knee pain, acute    • Menopausal symptoms    • Osteopenia    • Osteoporosis    • Pancreatitis     2002   • Polyp of sigmoid colon    • Thyroid disease        Past Surgical History:   Procedure Laterality Date   • BREAST SURGERY     • CATARACT EXTRACTION     • COLONOSCOPY N/A 8/11/2017    Procedure: COLONOSCOPY with polypectomy;  Surgeon: Juan Jose Reid MD;  Location: [facility];  Service:    • COLONOSCOPY N/A 10/5/2020    Procedure: COLONOSCOPY;  Surgeon: Juan Jose Reid MD;  Location: [facility];  Service: Gastroenterology;  Laterality: N/A;   • COLONOSCOPY W/ POLYPECTOMY     • HYSTERECTOMY      In 1997 for a cyst   • KNEE ARTHROSCOPY MENISCUS TRANSPLANT Right          Current Outpatient Medications:   •  aspirin 81 MG tablet, Take 81 mg by mouth daily., Disp: , Rfl:   •  levothyroxine (SYNTHROID, LEVOTHROID) 75 MCG tablet, , Disp: , Rfl:   •  levothyroxine (SYNTHROID, LEVOTHROID) 75 MCG tablet, Take 1 tablet by mouth  Daily., Disp: , Rfl:     No Known Allergies    Social History     Tobacco Use   • Smoking status: Never   • Smokeless tobacco: Former   Substance Use Topics   • Alcohol use: No   • Drug use: No       Ani Chandler  reports that she has never smoked. She has quit using smokeless tobacco..            Family History   Problem Relation Age of Onset   • Hypertension Mother    • Diabetes Father    • Hypertension Father    • Heart disease Other    • Stroke Other        Review of Systems   Constitutional: Negative.    HENT: Negative.    Eyes: Negative.    Respiratory: Negative.    Cardiovascular: Negative.    Gastrointestinal: Negative.    Endocrine: Negative.    Musculoskeletal: Positive for arthralgias and myalgias.   Skin: Negative.    Allergic/Immunologic: Negative.    Neurological: Negative.    Hematological: Negative.    Psychiatric/Behavioral: Negative.        Patient reports that she is not currently experiencing any symptoms of urinary incontinence.      noTESTED FOR CHLAMYDIA?    EXAM:  /58   Ht 162.6 cm (64\")   Wt 51.8 kg (114 lb 3.2 oz)   LMP  (LMP Unknown)   BMI 19.60 kg/m²     Labs:   Lab Results (last 24 hours)     Procedure Component Value Units Date/Time    POC Urinalysis Dipstick [[id number]] Collected: 01/03/23 0937    Specimen: Urine Updated: 01/03/23 0937     Color Yellow     Clarity, UA Clear     Glucose, UA Negative mg/dL      Bilirubin Negative     Ketones, UA Negative     Specific Gravity  1.005     Blood, UA Negative     pH, Urine 5.0     Protein, POC Negative mg/dL      Urobilinogen, UA Normal     Leukocytes Negative     Nitrite, UA Negative          Physical Exam  Vitals and nursing note reviewed. Exam conducted with a chaperone present.   Constitutional:       General: She is not in acute distress.     Appearance: She is well-developed. She is not diaphoretic.   HENT:      Head: Normocephalic and atraumatic.      Nose: Nose normal.   Eyes:      Extraocular Movements: Extraocular  movements intact.   Cardiovascular:      Rate and Rhythm: Normal rate.   Pulmonary:      Effort: Pulmonary effort is normal.   Chest:   Breasts:     Breasts are symmetrical.      Right: Normal. No mass, nipple discharge, skin change or tenderness.      Left: Normal. No mass, nipple discharge, skin change or tenderness.   Abdominal:      General: There is no distension.      Palpations: Abdomen is soft. There is no mass.      Tenderness: There is no abdominal tenderness. There is no guarding.   Genitourinary:     General: Normal vulva.      Pubic Area: No rash.       Labia:         Right: No rash, tenderness, lesion or injury.         Left: No rash, tenderness, lesion or injury.       Urethra: No prolapse or urethral lesion.      Vagina: Normal. No signs of injury and foreign body. No vaginal discharge, erythema, tenderness, bleeding, lesions or prolapsed vaginal walls.      Uterus: Absent.       Adnexa: Right adnexa normal and left adnexa normal.        Right: No mass, tenderness or fullness.          Left: No mass, tenderness or fullness.        Rectum: No tenderness, anal fissure or external hemorrhoid.      Comments: Cuff wnl, no pap done  Musculoskeletal:         General: No tenderness or deformity. Normal range of motion.      Cervical back: Normal range of motion.   Lymphadenopathy:      Upper Body:      Right upper body: No axillary adenopathy.      Left upper body: No axillary adenopathy.   Skin:     General: Skin is warm and dry.      Coloration: Skin is not pale.      Findings: No erythema or rash.   Neurological:      Mental Status: She is alert and oriented to person, place, and time.   Psychiatric:         Behavior: Behavior normal.         Thought Content: Thought content normal.         Judgment: Judgment normal.            As part of wellness and prevention, the following topics were discussed with the patient: healthy weight, substance abuse/misuse, mental health, encouraging self breast exam, and  other counseling and guidance done:  Nutrition, physical activity, healthy weight, injury prevention, misuse of tobacco, alcohol and drugs, sexual behavior and STDs, contraception, dental health, mental health, immunizations breast cancer screening and exams.    I saw the patient with a face mask, gloves and eye protection  The patient herself was masked.  Social distancing was observed as appropriate. All COVID precautions observed.  Pt encouraged to receive COVID vaccine if she hadn't already done this.     Assessment     1) GYN annual well woman exam.   2) PAP done today? No  3) problems addressed: none    MAMMOGRAM UP TO DATE IF AGE APPROPRIATE?  Yes  (if no, pt encouraged to schedule; risks of undiagnosed breast cancer reviewed with pt if she does not have a mammogram)    COLONOSCOPY UP TO DATE IF AGE APPROPRIATE? Yes  (if no, risks of undiagnosed colon cancer reviewed with pt if she fails to have the colonoscopy)    Fhx breast cancer? No    Fhx ovarian cancer? No    Fhx colon cancer? No    Invitae testing offered? Yes    Advance Care Planning   ACP discussion was held with the patient during this visit. Patient has an advance directive in EMR which is still valid.               Plan       Follow up prn or one year.    Pt instructed to call for results of any testing done today and that failure to call if she has not heard from us could result in inadequate treatment.  Pt verbalized her understanding.     Diagnoses and all orders for this visit:    1. Routine gynecological examination (Primary)  -     POC Urinalysis Dipstick    2. H/O: hysterectomy    3. Ductal carcinoma in situ (DCIS) of right breast: 2011    4. Other specified hypothyroidism    5. Well woman exam    6. Bunion of left foot  -     Ambulatory Referral to Podiatry    7. Arthritis of hand  -     Ambulatory Referral to Hand Surgery        RTO Return in about 1 year (around 1/3/2024) for Annual physical.    I spent 30+ minutes on the separately  reported service of evaluation and management of the listed problem. This time is not included in the time used to support the annual exam service also reported today.      Floyd Romero MD  01/03/23  10:45 EST

## 2023-01-03 ENCOUNTER — OFFICE VISIT (OUTPATIENT)
Dept: OBSTETRICS AND GYNECOLOGY | Facility: CLINIC | Age: 68
End: 2023-01-03
Payer: MEDICARE

## 2023-01-03 VITALS
HEIGHT: 64 IN | DIASTOLIC BLOOD PRESSURE: 58 MMHG | SYSTOLIC BLOOD PRESSURE: 112 MMHG | WEIGHT: 114.2 LBS | BODY MASS INDEX: 19.5 KG/M2

## 2023-01-03 DIAGNOSIS — D05.11 DUCTAL CARCINOMA IN SITU (DCIS) OF RIGHT BREAST: ICD-10-CM

## 2023-01-03 DIAGNOSIS — E03.8 OTHER SPECIFIED HYPOTHYROIDISM: ICD-10-CM

## 2023-01-03 DIAGNOSIS — Z01.419 ROUTINE GYNECOLOGICAL EXAMINATION: Primary | ICD-10-CM

## 2023-01-03 DIAGNOSIS — M19.049 ARTHRITIS OF HAND: ICD-10-CM

## 2023-01-03 DIAGNOSIS — Z90.710 H/O: HYSTERECTOMY: ICD-10-CM

## 2023-01-03 DIAGNOSIS — Z01.419 WELL WOMAN EXAM: ICD-10-CM

## 2023-01-03 DIAGNOSIS — M21.612 BUNION OF LEFT FOOT: ICD-10-CM

## 2023-01-03 PROCEDURE — 81002 URINALYSIS NONAUTO W/O SCOPE: CPT | Performed by: OBSTETRICS & GYNECOLOGY

## 2023-01-03 PROCEDURE — G0101 CA SCREEN;PELVIC/BREAST EXAM: HCPCS | Performed by: OBSTETRICS & GYNECOLOGY

## 2023-01-03 PROCEDURE — 99214 OFFICE O/P EST MOD 30 MIN: CPT | Performed by: OBSTETRICS & GYNECOLOGY

## 2023-01-03 PROCEDURE — 1160F RVW MEDS BY RX/DR IN RCRD: CPT | Performed by: OBSTETRICS & GYNECOLOGY

## 2023-01-03 PROCEDURE — 1159F MED LIST DOCD IN RCRD: CPT | Performed by: OBSTETRICS & GYNECOLOGY

## 2023-07-18 ENCOUNTER — TELEPHONE (OUTPATIENT)
Dept: ORTHOPEDIC SURGERY | Facility: CLINIC | Age: 68
End: 2023-07-18

## 2023-07-18 NOTE — TELEPHONE ENCOUNTER
Caller: Ani Chandler    Relationship to patient: Self    Best call back number:     Chief complaint: LEFT KNEE     Type of visit: NEW PATIENT     Requested date: 8/3/23 OR 8/4/23     If rescheduling, when is the original appointment: 8/2/23     Additional notes:PATIENT WOULD LIKE TO BE SEEN ON 8/3 OR 8/4 IF POSSIBLE

## 2023-08-03 ENCOUNTER — OFFICE VISIT (OUTPATIENT)
Dept: ORTHOPEDIC SURGERY | Facility: CLINIC | Age: 68
End: 2023-08-03
Payer: MEDICARE

## 2023-08-03 VITALS
HEART RATE: 66 BPM | BODY MASS INDEX: 20.28 KG/M2 | HEIGHT: 64 IN | SYSTOLIC BLOOD PRESSURE: 131 MMHG | WEIGHT: 118.8 LBS | DIASTOLIC BLOOD PRESSURE: 74 MMHG

## 2023-08-03 DIAGNOSIS — M17.12 PRIMARY OSTEOARTHRITIS OF LEFT KNEE: ICD-10-CM

## 2023-08-03 DIAGNOSIS — M22.2X2 PATELLOFEMORAL PAIN SYNDROME OF LEFT KNEE: ICD-10-CM

## 2023-08-03 DIAGNOSIS — M25.562 ACUTE PAIN OF LEFT KNEE: Primary | ICD-10-CM

## 2023-08-03 PROCEDURE — 99202 OFFICE O/P NEW SF 15 MIN: CPT | Performed by: ORTHOPAEDIC SURGERY

## 2023-08-03 NOTE — PROGRESS NOTES
Subjective:     Patient ID: Ani Chandler is a 67 y.o. female.    Chief Complaint:  Left knee pain, new patient  History of Present Illness  Ani Chandler presents to clinic today for evaluation of left knee pain.    The patient has been experiencing left knee pain for approximately 6 weeks. Her pain started after she was performing some increased bending activities when she went down on to the front of her knee. Since that point in time, she has had pain moderate to severe in intensity. It is intermittent in nature, primarily with deep flexion activities or kneeling on her knee. She does describe primarily as aching in nature. She denies any pavel locking or catching. She has minimal evidence of any swelling. She is primarily treated with activity modification, which has given her some mild improvement at this point. She likes to try to avoid medications. She has not had any injections or previous advanced imaging. The patient denies any buckling or giving way episodes.     Social History     Occupational History    Not on file   Tobacco Use    Smoking status: Never    Smokeless tobacco: Former   Substance and Sexual Activity    Alcohol use: No    Drug use: No    Sexual activity: Defer      Past Medical History:   Diagnosis Date    Cancer     Breast    Depression     with anxiety    GERD (gastroesophageal reflux disease)     Hypothyroidism     Knee pain, acute     Menopausal symptoms     Osteopenia     Osteoporosis     Pancreatitis     2002    Polyp of sigmoid colon     Thyroid disease      Past Surgical History:   Procedure Laterality Date    BREAST SURGERY      CATARACT EXTRACTION      COLONOSCOPY N/A 8/11/2017    Procedure: COLONOSCOPY with polypectomy;  Surgeon: Juan Jose Reid MD;  Location: [facility] OR;  Service:     COLONOSCOPY N/A 10/5/2020    Procedure: COLONOSCOPY;  Surgeon: Juan Jose Reid MD;  Location: [facility] OR;  Service: Gastroenterology;  Laterality: N/A;    COLONOSCOPY W/  "POLYPECTOMY      HYSTERECTOMY      In 1997 for a cyst    KNEE ARTHROSCOPY MENISCUS TRANSPLANT Right        Family History   Problem Relation Age of Onset    Hypertension Mother     Diabetes Father     Hypertension Father     Heart disease Other     Stroke Other          Review of Systems        Objective:  Vitals:    08/03/23 0811   BP: 131/74   Pulse: 66   Weight: 53.9 kg (118 lb 12.8 oz)   Height: 162.6 cm (64\")         08/03/23  0811   Weight: 53.9 kg (118 lb 12.8 oz)     Body mass index is 20.39 kg/mý.  Physical Exam    Vital signs reviewed.   General: No acute distress, alert and oriented  Eyes: conjunctiva clear; pupils equally round and reactive  ENT: external ears and nose atraumatic; oropharynx clear  CV: no peripheral edema  Resp: normal respiratory effort  Skin: no rashes or wounds; normal turgor  Psych: mood and affect appropriate; recent and remote memory intact        Ortho Exam       Left Knee-    ROM 0 to 130 degrees  4+/5 on flexion  4+/5 on extension  Maximal tenderness to palpation along the lateral gutter and lateral patellar facet as well as lateral retinaculum.   Kendra's exam- Negative    Effusion- None  Grade 1A Lachman  Anterior drawer- Negative  Posterior drawer- Negative  Stable opening on varus and valgus stress at 0 and 30.  Active patellar compression test- Positive    Log roll- No hip pain  Stinchfield- No hip pain    Straight leg raise- Negative, left lower extremity    Brisk cap refill all digits.    2+ dorsalis pedis pulse.    Imaging:  Left Knee X-Ray  Indication: Pain    AP, Lateral, and Babbie views    Findings:  No fracture  No bony lesion  Normal soft tissues  Moderate tricompartmental osteoarthritis with joint space narrowing, no regions of full-thickness bone-on-bone chondral loss, minimal reactive osteophyte formation    No prior studies were available for comparison.    Assessment:        1. Acute pain of left knee    2. Primary osteoarthritis of left knee       "     Plan:          Discussed treatment options at length with patient at today's visit.   At this point in time, she is going to try to supplement with glucosamine and chondroitin. She is already using turmeric at this point in time.  I did give her hip, core, and quad strengthening exercises to work on for range of motion and strength of her knee as well as hip, core, and quad strengthening.  She will continue with activity modification at this point based on her symptoms.  I will follow up with her on an as needed basis. If she continues to have recurrent issues, may consider advanced imaging, oral medication, or intra-articular injection.        Ani Chandler  was in agreement with plan and had all questions answered.     Orders:  Orders Placed This Encounter   Procedures    XR Knee 3+ View With [place] Left       Medications:  No orders of the defined types were placed in this encounter.      Followup:  No follow-ups on file.    Diagnoses and all orders for this visit:    1. Acute pain of left knee (Primary)  -     XR Knee 3+ View With [place] Left    2. Primary osteoarthritis of left knee          Dictated utilizing Dragon dictation       Transcribed from ambient dictation for Javon Kevin MD by Caroline Martinez.  08/03/23   09:38 EDT    Patient or patient representative verbalized consent to the visit recording.  I have personally performed the services described in this document as transcribed by the above individual, and it is both accurate and complete.

## 2023-09-11 ENCOUNTER — OFFICE VISIT (OUTPATIENT)
Dept: ORTHOPEDIC SURGERY | Facility: CLINIC | Age: 68
End: 2023-09-11
Payer: MEDICARE

## 2023-09-11 VITALS — HEIGHT: 64 IN | WEIGHT: 118 LBS | BODY MASS INDEX: 20.14 KG/M2

## 2023-09-11 DIAGNOSIS — M17.12 PRIMARY OSTEOARTHRITIS OF LEFT KNEE: Primary | ICD-10-CM

## 2023-09-11 PROCEDURE — 99212 OFFICE O/P EST SF 10 MIN: CPT | Performed by: ORTHOPAEDIC SURGERY

## 2023-09-11 NOTE — PROGRESS NOTES
Subjective:     Patient ID: Ani Chandler is a 68 y.o. female.    Chief Complaint:  Follow-up left knee pain, DJD  Plan last visit-activity modification, home exercise program    History of Present Illness  Ani Chandler returns to clinic today for follow-up evaluation of left knee pain.    The patient notes that she is still experiencing fairly intense pain, particularly over the anterior and anterolateral aspect of her left knee, particularly with deep flexion activities and going down on her knee in a kneeling position. She notes mild improvement with rest, no significant improvement with activity modification. She did not try the supplements after we talked about those at last visit. The patient denies any pavel locking of her knee. She denies any significant swelling. Pain does wax and wane and again mostly with deep flexion positions.       Social History     Occupational History    Not on file   Tobacco Use    Smoking status: Never    Smokeless tobacco: Former   Substance and Sexual Activity    Alcohol use: No    Drug use: No    Sexual activity: Defer      Past Medical History:   Diagnosis Date    Cancer     Breast    Depression     with anxiety    GERD (gastroesophageal reflux disease)     Hypothyroidism     Knee pain, acute     Menopausal symptoms     Osteopenia     Osteoporosis     Pancreatitis     2002    Polyp of sigmoid colon     Thyroid disease      Past Surgical History:   Procedure Laterality Date    BREAST SURGERY      CATARACT EXTRACTION      COLONOSCOPY N/A 8/11/2017    Procedure: COLONOSCOPY with polypectomy;  Surgeon: Juan Jose Reid MD;  Location: [facility] OR;  Service:     COLONOSCOPY N/A 10/5/2020    Procedure: COLONOSCOPY;  Surgeon: Juan Jose Reid MD;  Location: [facility] OR;  Service: Gastroenterology;  Laterality: N/A;    COLONOSCOPY W/ POLYPECTOMY      HYSTERECTOMY      In 1997 for a cyst    KNEE ARTHROSCOPY MENISCUS TRANSPLANT Right        Family History   Problem  "Relation Age of Onset    Hypertension Mother     Diabetes Father     Hypertension Father     Heart disease Other     Stroke Other          Review of Systems        Objective:  Vitals:    09/11/23 1016   Weight: 53.5 kg (118 lb)   Height: 162.6 cm (64\")         09/11/23  1016   Weight: 53.5 kg (118 lb)     Body mass index is 20.25 kg/m².  Physical Exam    Vital signs reviewed.   General: No acute distress, alert and oriented  Eyes: conjunctiva clear; pupils equally round and reactive  ENT: external ears and nose atraumatic; oropharynx clear  CV: no peripheral edema  Resp: normal respiratory effort  Skin: no rashes or wounds; normal turgor  Psych: mood and affect appropriate; recent and remote memory intact        Ortho Exam       Left Knee-    ROM 0 to 135 degrees  4+/5 on flexion  4+/5 on extension  Moderate tenderness along the lateral retinaculum as well as lateral patellar facet.  No medial or lateral joint line pain.  Kendra's exam- Negative  Effusion- Mild  Grade 1A Lachman  Anterior drawer- Negative  Posterior drawer- Negative  Stable opening on varus and valgus stress at 0 and 30 degrees.  Active patellar compression test- Mildly positive    Imaging:  None today  Assessment:        1. Primary osteoarthritis of left knee           Plan:          1. I discussed treatment options at length with patient at today's visit. I did give her the option for proceeding with a prescription anti-inflammatory medication, intra-articular injection, or even considering advanced imaging. She wants to hold off on these. She will try the supplement. If this gives her no response within 4 weeks, she will send me a message and we will consider doing an anti-inflammatory at that time. If she wants to proceed with injection, we can bring her into the office to see either me or Delmy or Dr. Gomez for an injection at that time.        Ani Chandler was in agreement with plan and had all questions answered.     Orders:  No orders " of the defined types were placed in this encounter.      Medications:  No orders of the defined types were placed in this encounter.      Followup:  Return if symptoms worsen or fail to improve.    Diagnoses and all orders for this visit:    1. Primary osteoarthritis of left knee (Primary)          Dictated utilizing Dragon dictation     Transcribed from ambient dictation for Javon Kevin MD by Geri Arizmendi.  09/11/23   12:04 EDT    Patient or patient representative verbalized consent to the visit recording.  I have personally performed the services described in this document as transcribed by the above individual, and it is both accurate and complete.

## 2023-12-11 ENCOUNTER — APPOINTMENT (OUTPATIENT)
Dept: WOMENS IMAGING | Facility: HOSPITAL | Age: 68
End: 2023-12-11
Payer: MEDICARE

## 2023-12-11 PROCEDURE — 77063 BREAST TOMOSYNTHESIS BI: CPT | Performed by: RADIOLOGY

## 2023-12-11 PROCEDURE — 77067 SCR MAMMO BI INCL CAD: CPT | Performed by: RADIOLOGY

## 2024-02-01 NOTE — PROGRESS NOTES
GYN Annual Exam     CC- Here for annual exam   Chief Complaint   Patient presents with    Gynecologic Exam     ANNUAL:12/15/21   MM23   COLON:10/5/20          Ani Chandler is a 68 y.o. No obstetric history on file. female who presents for annual well woman exam. No LMP recorded (lmp unknown). Patient has had a hysterectomy.    Problems in addition to need for annual: none.  Pt needs no rx    HPI: History of Present Illness    PMHX:    * No active hospital problems. *  ; otherwise none    OB History    No obstetric history on file.           Past Medical History:   Diagnosis Date    Cancer     Breast    Depression     with anxiety    GERD (gastroesophageal reflux disease)     Hypothyroidism     Knee pain, acute     Menopausal symptoms     Osteopenia     Osteoporosis     Pancreatitis         Polyp of sigmoid colon     Thyroid disease        Past Surgical History:   Procedure Laterality Date    BREAST SURGERY      CATARACT EXTRACTION      COLONOSCOPY N/A 2017    Procedure: COLONOSCOPY with polypectomy;  Surgeon: Juan Jose Reid MD;  Location: [facility];  Service:     COLONOSCOPY N/A 10/5/2020    Procedure: COLONOSCOPY;  Surgeon: Juan Jose Reid MD;  Location: [facility] OR;  Service: Gastroenterology;  Laterality: N/A;    COLONOSCOPY W/ POLYPECTOMY      HYSTERECTOMY      In  for a cyst    KNEE ARTHROSCOPY MENISCUS TRANSPLANT Right          Current Outpatient Medications:     zoledronic acid (RECLAST) 5 MG/100ML solution infusion, Infuse 100 mL into a venous catheter., Disp: , Rfl:     aspirin 81 MG tablet, Take 1 tablet by mouth Daily., Disp: , Rfl:     levothyroxine (SYNTHROID, LEVOTHROID) 75 MCG tablet, Take 1 tablet by mouth Daily., Disp: , Rfl:     No Known Allergies    Social History     Tobacco Use    Smoking status: Never    Smokeless tobacco: Former   Substance Use Topics    Alcohol use: No    Drug use: No       Ani Chandler  reports that she has never smoked. She  "has quit using smokeless tobacco..       Family History   Problem Relation Age of Onset    Hypertension Mother     Diabetes Father     Hypertension Father     Heart disease Other     Stroke Other        Review of Systems    Patient reports that she is not currently experiencing any symptoms of urinary incontinence.      noTESTED FOR CHLAMYDIA?    EXAM:  /82   Ht 162.6 cm (64\")   Wt 52.4 kg (115 lb 9.6 oz)   LMP  (LMP Unknown)   BMI 19.84 kg/m²     Labs:   Lab Results (last 24 hours)       ** No results found for the last 24 hours. **            Physical Exam  Vitals and nursing note reviewed. Exam conducted with a chaperone present.   Constitutional:       General: She is not in acute distress.     Appearance: She is well-developed. She is not diaphoretic.   HENT:      Head: Normocephalic and atraumatic.      Nose: Nose normal.   Eyes:      Extraocular Movements: Extraocular movements intact.   Cardiovascular:      Rate and Rhythm: Normal rate.   Pulmonary:      Effort: Pulmonary effort is normal.   Chest:   Breasts:     Breasts are symmetrical.      Right: Normal. No mass, nipple discharge, skin change or tenderness.      Left: Normal. No mass, nipple discharge, skin change or tenderness.   Abdominal:      General: There is no distension.      Palpations: Abdomen is soft. There is no mass.      Tenderness: There is no abdominal tenderness. There is no guarding.      Hernia: There is no hernia in the left inguinal area or right inguinal area.   Genitourinary:     General: Normal vulva.      Exam position: Lithotomy position.      Pubic Area: No rash.       Labia:         Right: No rash, tenderness, lesion or injury.         Left: No rash, tenderness, lesion or injury.       Urethra: No prolapse, urethral swelling or urethral lesion.      Vagina: Normal. No signs of injury and foreign body. No vaginal discharge, erythema, tenderness, bleeding, lesions or prolapsed vaginal walls.      Uterus: Absent.       " Adnexa: Right adnexa normal and left adnexa normal.        Right: No mass, tenderness or fullness.          Left: No mass, tenderness or fullness.        Rectum: No anal fissure or external hemorrhoid.   Musculoskeletal:         General: No tenderness or deformity. Normal range of motion.      Cervical back: Normal range of motion.   Lymphadenopathy:      Upper Body:      Right upper body: No axillary adenopathy.      Left upper body: No axillary adenopathy.      Lower Body: No right inguinal adenopathy. No left inguinal adenopathy.   Skin:     General: Skin is warm and dry.      Coloration: Skin is not pale.      Findings: No erythema or rash.   Neurological:      Mental Status: She is alert and oriented to person, place, and time.   Psychiatric:         Behavior: Behavior normal.         Thought Content: Thought content normal.         Judgment: Judgment normal.            As part of wellness and prevention, the following topics were discussed with the patient where appropriate: healthy weight, substance abuse/misuse, mental health, encouraging self breast exam, and other counseling and guidance done:  Nutrition, physical activity, healthy weight, injury prevention, misuse of tobacco, alcohol and drugs, sexual behavior and STDs, contraception, dental health, mental health, immunizations breast cancer screening and exams.    Assessment     1) GYN annual well woman exam.   2) PAP done today? Yes  3) problems addressed: none    MAMMOGRAM UP TO DATE IF AGE APPROPRIATE?  Yes  (if no, pt encouraged to schedule; risks of undiagnosed breast cancer reviewed with pt if she does not have a mammogram)    COLONOSCOPY UP TO DATE IF AGE APPROPRIATE? yes  (if no, risks of undiagnosed colon cancer reviewed with pt if she fails to have the colonoscopy)    Advance Care Planning   ACP discussion was held with the patient during this visit.              Plan       Follow up prn or one year.    Pt instructed to call for results of any  testing done today and that failure to call if she has not heard from us could result in inadequate treatment.  Pt verbalized her understanding.     Diagnoses and all orders for this visit:    1. Well woman exam (Primary)  -     Cancel: POC Urinalysis Dipstick  -     Pap IG (Image Guided)    2. H/O: hysterectomy    3. Ductal carcinoma in situ (DCIS) of right breast: 2011    4. Arthritis of hand    5. Other specified hypothyroidism    6. Osteoporosis screening  -     DEXA Bone Density Axial; Future    7. Screening for colon cancer  -     Ambulatory Referral to Colorectal Surgery    8. Cervical smear, as part of routine gynecological examination  -     Pap IG (Image Guided)        RTO Return in about 1 year (around 2/5/2025) for Annual physical.        Floyd Romero MD  02/05/24  12:41 EST

## 2024-02-05 ENCOUNTER — OFFICE VISIT (OUTPATIENT)
Dept: OBSTETRICS AND GYNECOLOGY | Facility: CLINIC | Age: 69
End: 2024-02-05
Payer: MEDICARE

## 2024-02-05 VITALS
WEIGHT: 115.6 LBS | HEIGHT: 64 IN | DIASTOLIC BLOOD PRESSURE: 82 MMHG | SYSTOLIC BLOOD PRESSURE: 128 MMHG | BODY MASS INDEX: 19.74 KG/M2

## 2024-02-05 DIAGNOSIS — Z12.11 SCREENING FOR COLON CANCER: ICD-10-CM

## 2024-02-05 DIAGNOSIS — Z90.710 H/O: HYSTERECTOMY: ICD-10-CM

## 2024-02-05 DIAGNOSIS — Z01.419 CERVICAL SMEAR, AS PART OF ROUTINE GYNECOLOGICAL EXAMINATION: ICD-10-CM

## 2024-02-05 DIAGNOSIS — Z01.419 WELL WOMAN EXAM: Primary | ICD-10-CM

## 2024-02-05 DIAGNOSIS — Z13.820 OSTEOPOROSIS SCREENING: ICD-10-CM

## 2024-02-05 DIAGNOSIS — E03.8 OTHER SPECIFIED HYPOTHYROIDISM: ICD-10-CM

## 2024-02-05 DIAGNOSIS — D05.11 DUCTAL CARCINOMA IN SITU (DCIS) OF RIGHT BREAST: ICD-10-CM

## 2024-02-05 DIAGNOSIS — M19.049 ARTHRITIS OF HAND: ICD-10-CM

## 2024-02-05 PROBLEM — Z13.9 SCREENING FOR CONDITION: Status: RESOLVED | Noted: 2023-01-02 | Resolved: 2024-02-05

## 2024-02-05 PROCEDURE — 1159F MED LIST DOCD IN RCRD: CPT | Performed by: OBSTETRICS & GYNECOLOGY

## 2024-02-05 PROCEDURE — 1160F RVW MEDS BY RX/DR IN RCRD: CPT | Performed by: OBSTETRICS & GYNECOLOGY

## 2024-02-05 PROCEDURE — G0101 CA SCREEN;PELVIC/BREAST EXAM: HCPCS | Performed by: OBSTETRICS & GYNECOLOGY

## 2024-02-05 RX ORDER — LEVOTHYROXINE SODIUM 0.07 MG/1
75 TABLET ORAL DAILY
COMMUNITY

## 2024-02-05 RX ORDER — ZOLEDRONIC ACID 5 MG/100ML
5 INJECTION, SOLUTION INTRAVENOUS
COMMUNITY
Start: 2024-01-04

## 2024-02-08 LAB
CYTOLOGIST CVX/VAG CYTO: NORMAL
CYTOLOGY CVX/VAG DOC CYTO: NORMAL
CYTOLOGY CVX/VAG DOC THIN PREP: NORMAL
DX ICD CODE: NORMAL
HIV 1 & 2 AB SER-IMP: NORMAL
Lab: NORMAL
OTHER STN SPEC: NORMAL
STAT OF ADQ CVX/VAG CYTO-IMP: NORMAL

## 2024-07-06 ENCOUNTER — HOSPITAL ENCOUNTER (EMERGENCY)
Facility: HOSPITAL | Age: 69
Discharge: HOME | End: 2024-07-06
Attending: EMERGENCY MEDICINE
Payer: MEDICARE

## 2024-07-06 ENCOUNTER — APPOINTMENT (OUTPATIENT)
Dept: GENERAL RADIOLOGY | Facility: HOSPITAL | Age: 69
End: 2024-07-06
Payer: MEDICARE

## 2024-07-06 VITALS
OXYGEN SATURATION: 97 % | DIASTOLIC BLOOD PRESSURE: 61 MMHG | WEIGHT: 104 LBS | TEMPERATURE: 98.6 F | SYSTOLIC BLOOD PRESSURE: 96 MMHG | HEIGHT: 64 IN | HEART RATE: 81 BPM | BODY MASS INDEX: 17.75 KG/M2 | RESPIRATION RATE: 16 BRPM

## 2024-07-06 DIAGNOSIS — R09.1 PLEURISY: Primary | ICD-10-CM

## 2024-07-06 LAB
ALBUMIN SERPL-MCNC: 4.4 G/DL (ref 3.5–5.2)
ALBUMIN/GLOB SERPL: 1.6 G/DL
ALP SERPL-CCNC: 82 U/L (ref 39–117)
ALT SERPL W P-5'-P-CCNC: 22 U/L (ref 1–33)
ANION GAP SERPL CALCULATED.3IONS-SCNC: 12.9 MMOL/L (ref 5–15)
AST SERPL-CCNC: 30 U/L (ref 1–32)
BASOPHILS # BLD AUTO: 0.02 10*3/MM3 (ref 0–0.2)
BASOPHILS NFR BLD AUTO: 0.2 % (ref 0–1.5)
BILIRUB SERPL-MCNC: 0.4 MG/DL (ref 0–1.2)
BUN SERPL-MCNC: 10 MG/DL (ref 8–23)
BUN/CREAT SERPL: 16.4 (ref 7–25)
CALCIUM SPEC-SCNC: 9.8 MG/DL (ref 8.6–10.5)
CHLORIDE SERPL-SCNC: 101 MMOL/L (ref 98–107)
CO2 SERPL-SCNC: 25.1 MMOL/L (ref 22–29)
CREAT SERPL-MCNC: 0.61 MG/DL (ref 0.57–1)
D DIMER PPP FEU-MCNC: 0.66 MCGFEU/ML (ref 0–0.68)
DEPRECATED RDW RBC AUTO: 46.1 FL (ref 37–54)
EGFRCR SERPLBLD CKD-EPI 2021: 97.5 ML/MIN/1.73
EOSINOPHIL # BLD AUTO: 0.01 10*3/MM3 (ref 0–0.4)
EOSINOPHIL NFR BLD AUTO: 0.1 % (ref 0.3–6.2)
ERYTHROCYTE [DISTWIDTH] IN BLOOD BY AUTOMATED COUNT: 12.9 % (ref 12.3–15.4)
GEN 5 2HR TROPONIN T REFLEX: <6 NG/L
GLOBULIN UR ELPH-MCNC: 2.8 GM/DL
GLUCOSE SERPL-MCNC: 123 MG/DL (ref 65–99)
HCT VFR BLD AUTO: 40.8 % (ref 34–46.6)
HGB BLD-MCNC: 13.1 G/DL (ref 12–15.9)
IMM GRANULOCYTES # BLD AUTO: 0.02 10*3/MM3 (ref 0–0.05)
IMM GRANULOCYTES NFR BLD AUTO: 0.2 % (ref 0–0.5)
LYMPHOCYTES # BLD AUTO: 0.71 10*3/MM3 (ref 0.7–3.1)
LYMPHOCYTES NFR BLD AUTO: 5.9 % (ref 19.6–45.3)
MCH RBC QN AUTO: 30.7 PG (ref 26.6–33)
MCHC RBC AUTO-ENTMCNC: 32.1 G/DL (ref 31.5–35.7)
MCV RBC AUTO: 95.6 FL (ref 79–97)
MONOCYTES # BLD AUTO: 0.71 10*3/MM3 (ref 0.1–0.9)
MONOCYTES NFR BLD AUTO: 5.9 % (ref 5–12)
NEUTROPHILS NFR BLD AUTO: 10.66 10*3/MM3 (ref 1.7–7)
NEUTROPHILS NFR BLD AUTO: 87.7 % (ref 42.7–76)
NT-PROBNP SERPL-MCNC: 183 PG/ML (ref 0–900)
PLATELET # BLD AUTO: 243 10*3/MM3 (ref 140–450)
PMV BLD AUTO: 9.8 FL (ref 6–12)
POTASSIUM SERPL-SCNC: 4 MMOL/L (ref 3.5–5.2)
PROT SERPL-MCNC: 7.2 G/DL (ref 6–8.5)
QT INTERVAL: 348 MS
QTC INTERVAL: 398 MS
RBC # BLD AUTO: 4.27 10*6/MM3 (ref 3.77–5.28)
SODIUM SERPL-SCNC: 139 MMOL/L (ref 136–145)
TROPONIN T DELTA: NORMAL
TROPONIN T SERPL HS-MCNC: <6 NG/L
WBC NRBC COR # BLD AUTO: 12.13 10*3/MM3 (ref 3.4–10.8)

## 2024-07-06 PROCEDURE — 36415 COLL VENOUS BLD VENIPUNCTURE: CPT

## 2024-07-06 PROCEDURE — 84484 ASSAY OF TROPONIN QUANT: CPT

## 2024-07-06 PROCEDURE — 85379 FIBRIN DEGRADATION QUANT: CPT | Performed by: EMERGENCY MEDICINE

## 2024-07-06 PROCEDURE — 84484 ASSAY OF TROPONIN QUANT: CPT | Performed by: EMERGENCY MEDICINE

## 2024-07-06 PROCEDURE — 83880 ASSAY OF NATRIURETIC PEPTIDE: CPT | Performed by: EMERGENCY MEDICINE

## 2024-07-06 PROCEDURE — 93005 ELECTROCARDIOGRAM TRACING: CPT | Performed by: EMERGENCY MEDICINE

## 2024-07-06 PROCEDURE — 71046 X-RAY EXAM CHEST 2 VIEWS: CPT

## 2024-07-06 PROCEDURE — 80053 COMPREHEN METABOLIC PANEL: CPT

## 2024-07-06 PROCEDURE — 93005 ELECTROCARDIOGRAM TRACING: CPT

## 2024-07-06 PROCEDURE — 85025 COMPLETE CBC W/AUTO DIFF WBC: CPT

## 2024-07-06 PROCEDURE — 99284 EMERGENCY DEPT VISIT MOD MDM: CPT

## 2024-07-06 PROCEDURE — 93010 ELECTROCARDIOGRAM REPORT: CPT | Performed by: INTERNAL MEDICINE

## 2024-07-06 RX ORDER — SODIUM CHLORIDE 0.9 % (FLUSH) 0.9 %
10 SYRINGE (ML) INJECTION AS NEEDED
Status: DISCONTINUED | OUTPATIENT
Start: 2024-07-06 | End: 2024-07-06 | Stop reason: HOSPADM

## 2024-07-06 NOTE — ED PROVIDER NOTES
Subjective   History of Present Illness  This 68-year-old white female was sent for urgent care in [place] for right-sided chest pain.  Patient reportedly had a negative EKG and chest x-ray.  The patient was at [facility] and had acute onset of intense sharp right-sided chest pain.  Patient rates the pain as 10/10.  It is 10/10 still when she takes a deep breath.  It is less intense when she is resting and breathing lightly.  The patient denies any diaphoresis, no nausea or vomiting.  No fever chills or cough.  The pain does not radiate.  Patient has not taken anything for the pain.  The patient has never had an MI.  She has never had a stress test or cardiac catheterization.  The patient does not have hypertension, does not smoke, is not diabetic, and her father had an MI in his 40s.  The patient has never had a blood clot in her leg or lung.  No recent long trips or surgeries.  No bleeding or clotting problems.  The patient had breast cancer in 2011, but has been in remission since.  Patient is not on any hormone therapy.  Patient denies any trauma.  Patient is never had a pneumothorax.  Patient states she had an upper respiratory illness of about a months duration back in June.  The patient states she does not want for pain at this time.    PCP:Juanito Moore MD      Review of Systems   Constitutional:  Negative for chills and fever.   HENT:  Negative for congestion, ear pain and sore throat.    Respiratory:  Negative for cough and shortness of breath.    Cardiovascular:  Positive for chest pain.   Gastrointestinal:  Negative for abdominal pain, diarrhea, nausea and vomiting.   Genitourinary:  Negative for difficulty urinating.   Musculoskeletal:  Negative for back pain and neck pain.   Skin:  Negative for rash.   Neurological:  Negative for headaches.       Past Medical History:   Diagnosis Date    Cancer     Breast    Depression     with anxiety    GERD (gastroesophageal reflux disease)     Hypothyroidism      Knee pain, acute     Menopausal symptoms     Osteopenia     Osteoporosis     Pancreatitis     2002    Polyp of sigmoid colon     Thyroid disease        No Known Allergies    Past Surgical History:   Procedure Laterality Date    BREAST SURGERY      CATARACT EXTRACTION      COLONOSCOPY N/A 8/11/2017    Procedure: COLONOSCOPY with polypectomy;  Surgeon: Juan Jose Reid MD;  Location: [facility] OR;  Service:     COLONOSCOPY N/A 10/5/2020    Procedure: COLONOSCOPY;  Surgeon: Juan Jose Reid MD;  Location: [facility] OR;  Service: Gastroenterology;  Laterality: N/A;    COLONOSCOPY W/ POLYPECTOMY      HYSTERECTOMY      In 1997 for a cyst    KNEE ARTHROSCOPY MENISCUS TRANSPLANT Right        Family History   Problem Relation Age of Onset    Hypertension Mother     Diabetes Father     Hypertension Father     Heart disease Other     Stroke Other        Social History     Socioeconomic History    Marital status:    Tobacco Use    Smoking status: Never    Smokeless tobacco: Former   Vaping Use    Vaping status: Never Used   Substance and Sexual Activity    Alcohol use: No    Drug use: No    Sexual activity: Defer           Objective   Physical Exam  Vitals (The blood pressure is 122/64, pulse 80, respirations 18, room air pulse ox 98%.  The temperature is 99.6 °F.) and nursing note reviewed.   Constitutional:       Appearance: She is well-developed.   HENT:      Head: Normocephalic and atraumatic.   Cardiovascular:      Rate and Rhythm: Normal rate and regular rhythm.      Heart sounds: Normal heart sounds. No murmur heard.     No friction rub. No gallop.   Pulmonary:      Comments: Decreased breath sounds right posterior inferior chest  Chest:      Chest wall: Tenderness (Right anterior chest wall) present.   Musculoskeletal:         General: Normal range of motion.      Cervical back: Normal range of motion and neck supple.      Right lower leg: No tenderness. No edema.      Left lower leg: No  tenderness. No edema.   Skin:     General: Skin is warm and dry.      Capillary Refill: Capillary refill takes less than 2 seconds.      Findings: No rash.   Neurological:      General: No focal deficit present.      Mental Status: She is alert and oriented to person, place, and time.         Procedures           ED Course  ED Course as of 07/06/24 1919   Sat Jul 06, 2024   1619 The glucose is 123, CMP is otherwise unremarkable. [RC]   1619 White blood cell count is 12, the relative neutrophil percent Is 87%, Absolute Neutrophil Count Is 10.6, CBC Is Otherwise Unremarkable. [RC]   1620 High-sensitivity opponent is less than 6 and normal. [RC]   1706 The proBNP is 183 and normal. [RC]   1706 The D-dimer was 0.66 and normal. [RC]   1915 2-hour troponin is less than 6 with a delta T of 0. [RC]   1916 The proBNP is 183 normal [RC]      ED Course User Index  [RC] Galileo Vera MD      16:20 EDT, 07/06/24:  EKG was initially reviewed by Dr. Stewart at 1459 further reviewed by me at 1620.  EKG shows normal sinus rhythm with rate of 79.  There is a normal axis with no hypertrophy.  The DC, QRS, QT intervals are unremarkable.  There is left atrial enlargement.  There is no ectopy.  There is no acute ST elevation or depression.                           16:21 EDT, 07/06/24:  An attempt was made to pull the chest x-ray report from urgent care and [place], I cannot pull this up.  We will repeat the chest x-ray.    19:19 EDT, 07/06/24:  Patient was reassessed.  She has no new complaints her vital signs were reviewed and are stable  The patient's laboratory values not concerning for acute coronary ischemia, pulmonary embolus, or congestive heart failure.  Patient's chest x-ray does not show pneumothorax or pneumonia or any fractured ribs.    19:19 EDT, 07/06/24:  The patient's diagnosis pleuritic chest pain was discussed with her.  The patient should take Motrin or Tylenol as needed as directed for pain.  The patient  should follow-up with Juanito Moore next week.  The patient should call the patient connection line for an appointment with local cardiology within 1 week.  Patient should return to the emergency department if there is increased pain, shortness of breath, worse in any way at all.  Patient should rest.  All the patient questions answered she will be discharged in good condition.          Medical Decision Making  Amount and/or Complexity of Data Reviewed  Labs: ordered.  Radiology: ordered.  ECG/medicine tests: ordered.    Risk  Prescription drug management.        Final diagnoses:   Pleurisy       ED Disposition  ED Disposition       None            No follow-up provider specified.       Medication List      No changes were made to your prescriptions during this visit.            Galileo Vera MD  07/06/24 0551

## 2024-07-06 NOTE — DISCHARGE INSTRUCTIONS
Call the patient connection line Monday for an appointment with local cardiology within 1 week.  Take Motrin or Tylenol as needed as directed for pain.  Follow-up with Dr. Moore within 1 week.  Turn to the emergency department if there is increased pain, shortness of breath, worsening way at all.

## 2024-12-16 ENCOUNTER — APPOINTMENT (OUTPATIENT)
Dept: WOMENS IMAGING | Facility: HOSPITAL | Age: 69
End: 2024-12-16
Payer: MEDICARE

## 2024-12-16 PROCEDURE — 77063 BREAST TOMOSYNTHESIS BI: CPT | Performed by: RADIOLOGY

## 2024-12-16 PROCEDURE — 77067 SCR MAMMO BI INCL CAD: CPT | Performed by: RADIOLOGY

## 2025-01-14 ENCOUNTER — TRANSCRIBE ORDERS (OUTPATIENT)
Dept: BONE DENSITY | Facility: HOSPITAL | Age: 70
End: 2025-01-14
Payer: MEDICARE

## 2025-01-14 DIAGNOSIS — M81.0 OSTEOPOROSIS, UNSPECIFIED OSTEOPOROSIS TYPE, UNSPECIFIED PATHOLOGICAL FRACTURE PRESENCE: Primary | ICD-10-CM

## 2025-01-21 ENCOUNTER — APPOINTMENT (OUTPATIENT)
Dept: BONE DENSITY | Facility: HOSPITAL | Age: 70
End: 2025-01-21
Payer: MEDICARE

## 2025-01-21 DIAGNOSIS — M81.0 OSTEOPOROSIS, UNSPECIFIED OSTEOPOROSIS TYPE, UNSPECIFIED PATHOLOGICAL FRACTURE PRESENCE: ICD-10-CM

## 2025-01-21 PROCEDURE — 77080 DXA BONE DENSITY AXIAL: CPT

## 2025-02-23 NOTE — PROGRESS NOTES
GYN Annual Exam     CC- Here for annual exam   Chief Complaint   Patient presents with    Gynecologic Exam          Ani Chandler is a 69 y.o. No obstetric history on file. female who presents for annual well woman exam. No LMP recorded (lmp unknown). Patient has had a hysterectomy.    Problems in addition to need for annual: none    HPI: History of Present Illness    PMHX:  Patient Active Problem List   Diagnosis    Ductal carcinoma in situ (DCIS) of right breast: 2011    Hypothyroidism    H/O: hysterectomy    Encounter for screening for malignant neoplasm of colon    Bunion of left foot    Arthritis of hand   ; otherwise none    OB History    No obstetric history on file.         Past Medical History:   Diagnosis Date    Cancer     Breast    Depression     with anxiety    GERD (gastroesophageal reflux disease)     Hypothyroidism     Knee pain, acute     Menopausal symptoms     Osteopenia     Osteoporosis     Pancreatitis     2002    Polyp of sigmoid colon     Thyroid disease        Past Surgical History:   Procedure Laterality Date    BREAST SURGERY      CATARACT EXTRACTION      COLONOSCOPY N/A 8/11/2017    Procedure: COLONOSCOPY with polypectomy;  Surgeon: Juan Jose Reid MD;  Location: [facility];  Service:     COLONOSCOPY N/A 10/5/2020    Procedure: COLONOSCOPY;  Surgeon: Juan Jose Reid MD;  Location: [facility] OR;  Service: Gastroenterology;  Laterality: N/A;    COLONOSCOPY W/ POLYPECTOMY      HYSTERECTOMY      In 1997 for a cyst    KNEE ARTHROSCOPY MENISCUS TRANSPLANT Right          Current Outpatient Medications:     zoledronic acid (RECLAST) 5 MG/100ML solution infusion, Infuse 100 mL into a venous catheter., Disp: , Rfl:     aspirin 81 MG tablet, Take 1 tablet by mouth Daily., Disp: , Rfl:     Calcium Carbonate (CALCI-MIX PO), Take  by mouth., Disp: , Rfl:     levothyroxine (SYNTHROID, LEVOTHROID) 75 MCG tablet, Take 1 tablet by mouth Daily., Disp: , Rfl:     multivitamin (MULTI-DAY  "VITAMINS PO), Take  by mouth Daily., Disp: , Rfl:     Omega 3 1000 MG capsule, Take  by mouth., Disp: , Rfl:     No Known Allergies    Social History     Tobacco Use    Smoking status: Never    Smokeless tobacco: Former   Vaping Use    Vaping status: Never Used   Substance Use Topics    Alcohol use: No    Drug use: No       Ani Chandler  reports that she has never smoked. She has quit using smokeless tobacco..       Family History   Problem Relation Age of Onset    Hypertension Mother     Diabetes Father     Hypertension Father     Heart disease Other     Stroke Other        Review of Systems   Constitutional: Negative.    HENT: Negative.     Eyes: Negative.    Respiratory: Negative.     Cardiovascular: Negative.    Gastrointestinal: Negative.    Endocrine: Negative.    Musculoskeletal: Negative.    Skin: Negative.    Allergic/Immunologic: Negative.    Neurological: Negative.    Hematological: Negative.    Psychiatric/Behavioral: Negative.         Patient reports that she is not currently experiencing any symptoms of urinary incontinence.      noTESTED FOR CHLAMYDIA?    Gardisil indicated? (9-46yo) 0,2,6 months: no    EXAM:  /62   Ht 162.6 cm (64\")   Wt 50.8 kg (112 lb)   LMP  (LMP Unknown)   BMI 19.22 kg/m²     Physical Exam  Vitals and nursing note reviewed. Exam conducted with a chaperone present.   Constitutional:       General: She is not in acute distress.     Appearance: She is well-developed. She is not diaphoretic.   HENT:      Head: Normocephalic and atraumatic.      Nose: Nose normal.   Eyes:      Extraocular Movements: Extraocular movements intact.   Cardiovascular:      Rate and Rhythm: Normal rate.   Pulmonary:      Effort: Pulmonary effort is normal.   Chest:   Breasts:     Breasts are symmetrical.      Right: Normal. No mass, nipple discharge, skin change or tenderness.      Left: Normal. No mass, nipple discharge, skin change or tenderness.   Abdominal:      General: There is no " distension.      Palpations: Abdomen is soft. There is no mass.      Tenderness: There is no abdominal tenderness. There is no guarding.      Hernia: There is no hernia in the left inguinal area or right inguinal area.   Genitourinary:     General: Normal vulva.      Exam position: Lithotomy position.      Pubic Area: No rash.       Labia:         Right: No rash, tenderness, lesion or injury.         Left: No rash, tenderness, lesion or injury.       Urethra: No prolapse, urethral swelling or urethral lesion.      Vagina: Normal. No signs of injury and foreign body. No vaginal discharge, erythema, tenderness, bleeding, lesions or prolapsed vaginal walls.      Uterus: Absent.       Adnexa: Right adnexa normal and left adnexa normal.        Right: No mass, tenderness or fullness.          Left: No mass, tenderness or fullness.        Rectum: No anal fissure or external hemorrhoid.   Musculoskeletal:         General: No tenderness or deformity. Normal range of motion.      Cervical back: Normal range of motion.   Lymphadenopathy:      Upper Body:      Right upper body: No axillary adenopathy.      Left upper body: No axillary adenopathy.      Lower Body: No right inguinal adenopathy. No left inguinal adenopathy.   Skin:     General: Skin is warm and dry.      Coloration: Skin is not pale.      Findings: No erythema or rash.   Neurological:      Mental Status: She is alert and oriented to person, place, and time.   Psychiatric:         Behavior: Behavior normal.         Thought Content: Thought content normal.         Judgment: Judgment normal.         Labs:   Lab Results (last 24 hours)       Procedure Component Value Units Date/Time    POC Urinalysis Dipstick [[id number]] Collected: 02/26/25 0917    Specimen: Urine Updated: 02/26/25 0919     Color Yellow     Clarity, UA Clear     Glucose, UA Negative mg/dL      Bilirubin Negative     Ketones, UA Negative     Specific Gravity  1.030     Blood, UA Negative     pH, Urine  5.0     Protein, POC Negative mg/dL      Urobilinogen, UA Normal     Leukocytes Negative     Nitrite, UA Negative                As part of wellness and prevention, the following topics were discussed with the patient where appropriate: healthy weight, substance abuse/misuse, mental health, encouraging self breast exam, and other counseling and guidance done:  Nutrition, physical activity, healthy weight, injury prevention, misuse of tobacco, alcohol and drugs, sexual behavior and STDs, contraception, dental health, mental health, immunizations breast cancer screening and exams.      Mammogram:   Last Completed Mammogram            MAMMOGRAM (Every 2 Years) Next due on 12/16/2026 12/16/2024  MAMMO Scan    12/11/2023  SCANNED - MAMMO    12/07/2022  SCANNED - MAMMO    10/19/2021  SCANNED - MAMMO    10/07/2020  SCANNED - MAMMO    Only the first 5 history entries have been loaded, but more history exists.                  MAMMOGRAM UP TO DATE IF AGE APPROPRIATE?  Yes  (if no, pt encouraged to schedule; risks of undiagnosed breast cancer reviewed with pt if she does not have a mammogram)    Colonoscopy:   Last Completed Colonoscopy            COLORECTAL CANCER SCREENING (COLONOSCOPY - Every 5 Years) Next due on 10/5/2025      10/05/2020  Surgical Procedure: COLONOSCOPY    10/05/2020  COLONOSCOPY (Done)    08/11/2017  Surgical Procedure: COLONOSCOPY                  COLONOSCOPY UP TO DATE IF AGE APPROPRIATE? Yes  (if no, risks of undiagnosed colon cancer reviewed with pt if she fails to have the colonoscopy)    Assessment/Plan:     1) GYN annual well woman exam.   2) PAP done today?   3) problems addressed:     * No active hospital problems. *       Advance Care Planning   ACP discussion was held with the patient during this visit.            Follow up prn or one year.    Pt instructed to call for results of any testing done today and that failure to call if she has not heard from us could result in inadequate  treatment.  Pt verbalized her understanding.   Diagnoses and all orders for this visit:    1. H/O: hysterectomy (Primary)    2. Ductal carcinoma in situ (DCIS) of right breast: 2011    3. Other specified hypothyroidism    4. Well woman exam    5. Routine gynecological examination  -     POC Urinalysis Dipstick           RTO Return in about 1 year (around 2/26/2026) for Annual physical.      lFoyd Romero MD  02/26/25  10:23 EST

## 2025-02-26 ENCOUNTER — OFFICE VISIT (OUTPATIENT)
Dept: OBSTETRICS AND GYNECOLOGY | Facility: CLINIC | Age: 70
End: 2025-02-26
Payer: MEDICARE

## 2025-02-26 VITALS
SYSTOLIC BLOOD PRESSURE: 108 MMHG | DIASTOLIC BLOOD PRESSURE: 62 MMHG | WEIGHT: 112 LBS | BODY MASS INDEX: 19.12 KG/M2 | HEIGHT: 64 IN

## 2025-02-26 DIAGNOSIS — Z90.710 H/O: HYSTERECTOMY: Primary | ICD-10-CM

## 2025-02-26 DIAGNOSIS — Z01.419 WELL WOMAN EXAM: ICD-10-CM

## 2025-02-26 DIAGNOSIS — Z01.419 ROUTINE GYNECOLOGICAL EXAMINATION: ICD-10-CM

## 2025-02-26 DIAGNOSIS — D05.11 DUCTAL CARCINOMA IN SITU (DCIS) OF RIGHT BREAST: ICD-10-CM

## 2025-02-26 DIAGNOSIS — E03.8 OTHER SPECIFIED HYPOTHYROIDISM: ICD-10-CM

## 2025-02-26 LAB
BILIRUB BLD-MCNC: NEGATIVE MG/DL
CLARITY, POC: CLEAR
COLOR UR: YELLOW
GLUCOSE UR STRIP-MCNC: NEGATIVE MG/DL
KETONES UR QL: NEGATIVE
LEUKOCYTE EST, POC: NEGATIVE
NITRITE UR-MCNC: NEGATIVE MG/ML
PH UR: 5 [PH] (ref 5–8)
PROT UR STRIP-MCNC: NEGATIVE MG/DL
RBC # UR STRIP: NEGATIVE /UL
SP GR UR: 1.03 (ref 1–1.03)
UROBILINOGEN UR QL: NORMAL

## 2025-02-26 RX ORDER — ZOLEDRONIC ACID 0.05 MG/ML
5 INJECTION, SOLUTION INTRAVENOUS
COMMUNITY
Start: 2025-01-28

## (undated) DEVICE — Device

## (undated) DEVICE — KT ORCA ORCAPOD DISP STRL

## (undated) DEVICE — VIAL FORMALIN CAP 10P 40ML

## (undated) DEVICE — BW-412T DISP COMBO CLEANING BRUSH: Brand: SINGLE USE COMBINATION CLEANING BRUSH

## (undated) DEVICE — JACKT LAB F/R KNIT CUFF/COLR XLG BLU

## (undated) DEVICE — SYR LL 3CC

## (undated) DEVICE — SPNG GZ WOVN 4X4IN 12PLY 10/BX STRL

## (undated) DEVICE — FRCP BX RADJAW4 NDL 2.8 240CM LG OG BX40

## (undated) DEVICE — SUCTION CANISTER, 3000CC,SAFELINER: Brand: DEROYAL

## (undated) DEVICE — HYBRID TUBING/CAP SET FOR OLYMPUS® SCOPES: Brand: ERBE

## (undated) DEVICE — Device: Brand: DEFENDO AIR/WATER/SUCTION AND BIOPSY VALVE

## (undated) DEVICE — GLV SURG NEOLON 2G PF LF 7.5 STRL

## (undated) DEVICE — MASK,FACE,SHIELD,BLUE,ANTI FOG,TIES: Brand: MEDLINE

## (undated) DEVICE — GLV SURG SENSICARE PI MIC PF SZ7.5 LF STRL

## (undated) DEVICE — ENDOSCOPY PORT CONNECTOR FOR OLYMPUS® SCOPES: Brand: ERBE

## (undated) DEVICE — GOWN ISOL W/THUMB UNIV BLU BX/15